# Patient Record
Sex: MALE | Race: WHITE | NOT HISPANIC OR LATINO | URBAN - METROPOLITAN AREA
[De-identification: names, ages, dates, MRNs, and addresses within clinical notes are randomized per-mention and may not be internally consistent; named-entity substitution may affect disease eponyms.]

---

## 2017-07-05 ENCOUNTER — OUTPATIENT (OUTPATIENT)
Dept: OUTPATIENT SERVICES | Facility: HOSPITAL | Age: 77
LOS: 1 days | Discharge: HOME | End: 2017-07-05

## 2017-07-05 DIAGNOSIS — S89.80XA OTHER SPECIFIED INJURIES OF UNSPECIFIED LOWER LEG, INITIAL ENCOUNTER: ICD-10-CM

## 2018-04-18 ENCOUNTER — OUTPATIENT (OUTPATIENT)
Dept: OUTPATIENT SERVICES | Facility: HOSPITAL | Age: 78
LOS: 1 days | Discharge: HOME | End: 2018-04-18

## 2018-04-18 DIAGNOSIS — N40.0 BENIGN PROSTATIC HYPERPLASIA WITHOUT LOWER URINARY TRACT SYMPTOMS: ICD-10-CM

## 2018-04-27 ENCOUNTER — OUTPATIENT (OUTPATIENT)
Dept: OUTPATIENT SERVICES | Facility: HOSPITAL | Age: 78
LOS: 1 days | Discharge: HOME | End: 2018-04-27

## 2018-04-27 DIAGNOSIS — R16.0 HEPATOMEGALY, NOT ELSEWHERE CLASSIFIED: ICD-10-CM

## 2018-11-19 ENCOUNTER — OUTPATIENT (OUTPATIENT)
Dept: OUTPATIENT SERVICES | Facility: HOSPITAL | Age: 78
LOS: 1 days | Discharge: HOME | End: 2018-11-19

## 2018-11-19 DIAGNOSIS — R60.9 EDEMA, UNSPECIFIED: ICD-10-CM

## 2019-03-26 ENCOUNTER — OUTPATIENT (OUTPATIENT)
Dept: OUTPATIENT SERVICES | Facility: HOSPITAL | Age: 79
LOS: 1 days | Discharge: HOME | End: 2019-03-26

## 2019-03-26 DIAGNOSIS — R52 PAIN, UNSPECIFIED: ICD-10-CM

## 2021-01-26 PROBLEM — Z00.00 ENCOUNTER FOR PREVENTIVE HEALTH EXAMINATION: Status: ACTIVE | Noted: 2021-01-26

## 2021-02-04 ENCOUNTER — APPOINTMENT (OUTPATIENT)
Dept: VASCULAR SURGERY | Facility: CLINIC | Age: 81
End: 2021-02-04
Payer: MEDICARE

## 2021-02-04 VITALS
WEIGHT: 285 LBS | HEIGHT: 70 IN | DIASTOLIC BLOOD PRESSURE: 75 MMHG | BODY MASS INDEX: 40.8 KG/M2 | TEMPERATURE: 97.2 F | SYSTOLIC BLOOD PRESSURE: 139 MMHG | HEART RATE: 71 BPM

## 2021-02-04 DIAGNOSIS — M19.90 UNSPECIFIED OSTEOARTHRITIS, UNSPECIFIED SITE: ICD-10-CM

## 2021-02-04 DIAGNOSIS — I10 ESSENTIAL (PRIMARY) HYPERTENSION: ICD-10-CM

## 2021-02-04 DIAGNOSIS — Z87.891 PERSONAL HISTORY OF NICOTINE DEPENDENCE: ICD-10-CM

## 2021-02-04 DIAGNOSIS — I48.0 PAROXYSMAL ATRIAL FIBRILLATION: ICD-10-CM

## 2021-02-04 DIAGNOSIS — E78.00 PURE HYPERCHOLESTEROLEMIA, UNSPECIFIED: ICD-10-CM

## 2021-02-04 DIAGNOSIS — I25.10 ATHEROSCLEROTIC HEART DISEASE OF NATIVE CORONARY ARTERY W/OUT ANGINA PECTORIS: ICD-10-CM

## 2021-02-04 DIAGNOSIS — I70.212 ATHEROSCLEROSIS OF NATIVE ARTERIES OF EXTREMITIES WITH INTERMITTENT CLAUDICATION, LEFT LEG: ICD-10-CM

## 2021-02-04 PROCEDURE — 99203 OFFICE O/P NEW LOW 30 MIN: CPT

## 2021-02-04 PROCEDURE — 93926 LOWER EXTREMITY STUDY: CPT

## 2021-02-04 RX ORDER — TRAMADOL HYDROCHLORIDE 50 MG/1
50 TABLET, COATED ORAL
Refills: 0 | Status: ACTIVE | COMMUNITY

## 2021-02-04 RX ORDER — EZETIMIBE 10 MG/1
10 TABLET ORAL
Refills: 0 | Status: ACTIVE | COMMUNITY

## 2021-02-04 RX ORDER — FUROSEMIDE 40 MG/1
40 TABLET ORAL
Refills: 0 | Status: ACTIVE | COMMUNITY

## 2021-02-04 RX ORDER — APIXABAN 5 MG/1
5 TABLET, FILM COATED ORAL
Refills: 0 | Status: ACTIVE | COMMUNITY

## 2021-02-04 RX ORDER — UBIDECARENONE 200 MG
CAPSULE ORAL
Refills: 0 | Status: ACTIVE | COMMUNITY

## 2021-02-04 RX ORDER — METOPROLOL SUCCINATE 25 MG/1
25 TABLET, EXTENDED RELEASE ORAL
Refills: 0 | Status: ACTIVE | COMMUNITY

## 2021-02-04 RX ORDER — TAMSULOSIN HCL 0.4 MG
0.4 CAPSULE ORAL
Refills: 0 | Status: ACTIVE | COMMUNITY

## 2021-02-04 RX ORDER — ATORVASTATIN CALCIUM 40 MG/1
40 TABLET, FILM COATED ORAL
Refills: 0 | Status: ACTIVE | COMMUNITY

## 2021-02-04 NOTE — HISTORY OF PRESENT ILLNESS
[FreeTextEntry1] : 81 y/o gentleman with recent episode of left leg pain, treated with Keflex for left leg cellulitis, leg pain has resolved, sent in by PMD for evaluation of PVD. He is on Eliquis for h/o atrial fibrillation.

## 2021-02-04 NOTE — DATA REVIEWED
[FreeTextEntry1] : I performed an arterial duplex which was medically necessary to evaluate for arterial insufficiency. It showed patent left femoral, popliteal arteries.\par

## 2021-02-04 NOTE — ASSESSMENT
[FreeTextEntry1] : 81 y/o gentleman with venous insufficiency, recent bout of left leg cellulitis, improving with Keflex. \par \par He has palpable lower extremity pulses on exam and arterial duplex today showed patent left femoral and popliteal arteries.\par \par No vascular surgical intervention is needed and he can follow up as needed.

## 2021-05-08 ENCOUNTER — OUTPATIENT (OUTPATIENT)
Dept: OUTPATIENT SERVICES | Facility: HOSPITAL | Age: 81
LOS: 1 days | Discharge: HOME | End: 2021-05-08

## 2021-05-08 ENCOUNTER — LABORATORY RESULT (OUTPATIENT)
Age: 81
End: 2021-05-08

## 2021-05-08 DIAGNOSIS — Z11.59 ENCOUNTER FOR SCREENING FOR OTHER VIRAL DISEASES: ICD-10-CM

## 2021-05-11 ENCOUNTER — APPOINTMENT (OUTPATIENT)
Dept: PULMONOLOGY | Facility: CLINIC | Age: 81
End: 2021-05-11
Payer: MEDICARE

## 2021-05-11 ENCOUNTER — APPOINTMENT (OUTPATIENT)
Age: 81
End: 2021-05-11
Payer: MEDICARE

## 2021-05-11 VITALS
BODY MASS INDEX: 41.66 KG/M2 | HEIGHT: 70 IN | OXYGEN SATURATION: 95 % | DIASTOLIC BLOOD PRESSURE: 80 MMHG | RESPIRATION RATE: 14 BRPM | WEIGHT: 291 LBS | SYSTOLIC BLOOD PRESSURE: 128 MMHG | HEART RATE: 82 BPM

## 2021-05-11 PROCEDURE — ZZZZZ: CPT

## 2021-05-11 PROCEDURE — 94729 DIFFUSING CAPACITY: CPT

## 2021-05-11 PROCEDURE — 99213 OFFICE O/P EST LOW 20 MIN: CPT | Mod: 25

## 2021-05-11 PROCEDURE — 94010 BREATHING CAPACITY TEST: CPT

## 2021-05-11 PROCEDURE — 94727 GAS DIL/WSHOT DETER LNG VOL: CPT

## 2021-05-11 NOTE — HISTORY OF PRESENT ILLNESS
[Excessive Sleepiness-Day] : no excessive daytime sleepiness [Witnessed Apnea] : no witnessed apnea during sleep [Witnessed Gasping] : no witnessed gasping during sleep [Snoring] : no snoring [Unrefreshing Sleep] : no unrefreshing sleep [Sleepy When Sedentary] : no sleepy when sedentary [Impaired Concentration] : no impaired concentration [CPAP] : CPAP [Good Compliance] : good compliance with treatment [Good Tolerance] : good tolerance of treatment [Good Symptom Control] : good symptom control

## 2021-07-15 ENCOUNTER — TRANSCRIPTION ENCOUNTER (OUTPATIENT)
Age: 81
End: 2021-07-15

## 2021-09-04 ENCOUNTER — TRANSCRIPTION ENCOUNTER (OUTPATIENT)
Age: 81
End: 2021-09-04

## 2021-11-23 ENCOUNTER — APPOINTMENT (OUTPATIENT)
Age: 81
End: 2021-11-23

## 2021-12-28 ENCOUNTER — APPOINTMENT (OUTPATIENT)
Age: 81
End: 2021-12-28
Payer: MEDICARE

## 2021-12-28 PROCEDURE — 99441: CPT | Mod: 95

## 2021-12-28 NOTE — HISTORY OF PRESENT ILLNESS
[Home] : at home, [unfilled] , at the time of the visit. [Medical Office: ([facility])___] : at the medical office located in

## 2022-12-13 ENCOUNTER — APPOINTMENT (OUTPATIENT)
Dept: PULMONOLOGY | Facility: CLINIC | Age: 82
End: 2022-12-13

## 2022-12-13 VITALS
SYSTOLIC BLOOD PRESSURE: 110 MMHG | HEART RATE: 62 BPM | OXYGEN SATURATION: 98 % | DIASTOLIC BLOOD PRESSURE: 70 MMHG | WEIGHT: 281 LBS | HEIGHT: 70 IN | BODY MASS INDEX: 40.23 KG/M2

## 2022-12-13 PROCEDURE — 99214 OFFICE O/P EST MOD 30 MIN: CPT

## 2023-05-16 ENCOUNTER — APPOINTMENT (OUTPATIENT)
Dept: ORTHOPEDIC SURGERY | Facility: CLINIC | Age: 83
End: 2023-05-16
Payer: MEDICARE

## 2023-05-16 VITALS — BODY MASS INDEX: 40.23 KG/M2 | WEIGHT: 281 LBS | HEIGHT: 70 IN

## 2023-05-16 DIAGNOSIS — M18.12 UNILATERAL PRIMARY OSTEOARTHRITIS OF FIRST CARPOMETACARPAL JOINT, LEFT HAND: ICD-10-CM

## 2023-05-16 DIAGNOSIS — M18.11 UNILATERAL PRIMARY OSTEOARTHRITIS OF FIRST CARPOMETACARPAL JOINT, RIGHT HAND: ICD-10-CM

## 2023-05-16 PROCEDURE — 99202 OFFICE O/P NEW SF 15 MIN: CPT

## 2023-05-16 NOTE — HISTORY OF PRESENT ILLNESS
[de-identified] : 82-year-old male comes the office for evaluation was having pain discomfort in both hands he was actually placed on prednisone for inflammation that was on a blood test and he currently is still on the prednisone and feeling much better in the hands.  When he was having the pain and discomfort he had no numbness and tingling he was having difficult time opening jars and things like

## 2023-05-16 NOTE — PHYSICAL EXAM
[de-identified] : Patient has negative Tinel's medial compression test bilaterally.  Good range of motion of the fingers.  He has no swelling erythema ecchymosis or abrasions he does have axial grind to both thumbs.

## 2023-05-16 NOTE — ASSESSMENT
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  He feels better now because he was on prednisone.  He may get worse when the prednisone stops.  He was advised possibly for anti-inflammatories.  The natural history of the condition was discussed.  He will see us back on an as needed basis.

## 2023-06-20 ENCOUNTER — APPOINTMENT (OUTPATIENT)
Dept: PULMONOLOGY | Facility: CLINIC | Age: 83
End: 2023-06-20

## 2023-07-20 ENCOUNTER — APPOINTMENT (OUTPATIENT)
Dept: PULMONOLOGY | Facility: CLINIC | Age: 83
End: 2023-07-20
Payer: MEDICARE

## 2023-07-20 VITALS
OXYGEN SATURATION: 96 % | HEIGHT: 70 IN | BODY MASS INDEX: 40.23 KG/M2 | WEIGHT: 281 LBS | RESPIRATION RATE: 14 BRPM | HEART RATE: 58 BPM | SYSTOLIC BLOOD PRESSURE: 154 MMHG | DIASTOLIC BLOOD PRESSURE: 78 MMHG

## 2023-07-20 PROCEDURE — 94729 DIFFUSING CAPACITY: CPT

## 2023-07-20 PROCEDURE — 94010 BREATHING CAPACITY TEST: CPT

## 2023-07-20 PROCEDURE — 94727 GAS DIL/WSHOT DETER LNG VOL: CPT

## 2023-07-20 PROCEDURE — 99213 OFFICE O/P EST LOW 20 MIN: CPT | Mod: 25

## 2023-07-20 NOTE — HISTORY OF PRESENT ILLNESS
[TextBox_4] : Overall he is doing good he has a new CPAP machine been using it every night getting benefit denies any cough wheezing shortness of breath even on exertion has been trying to lose weight not succeeding\par CT scan from December 2022 reviewed stable nodule from 2021 4 mm\par PFT today reviewed overall stable

## 2023-12-08 ENCOUNTER — EMERGENCY (EMERGENCY)
Facility: HOSPITAL | Age: 83
LOS: 0 days | Discharge: HOME | End: 2023-12-08
Attending: EMERGENCY MEDICINE
Payer: MEDICARE

## 2023-12-08 VITALS
WEIGHT: 283.07 LBS | DIASTOLIC BLOOD PRESSURE: 77 MMHG | RESPIRATION RATE: 18 BRPM | SYSTOLIC BLOOD PRESSURE: 162 MMHG | OXYGEN SATURATION: 95 % | HEIGHT: 70 IN | TEMPERATURE: 98 F | HEART RATE: 85 BPM

## 2023-12-08 DIAGNOSIS — S81.802A UNSPECIFIED OPEN WOUND, LEFT LOWER LEG, INITIAL ENCOUNTER: ICD-10-CM

## 2023-12-08 DIAGNOSIS — L03.116 CELLULITIS OF LEFT LOWER LIMB: ICD-10-CM

## 2023-12-08 DIAGNOSIS — M79.605 PAIN IN LEFT LEG: ICD-10-CM
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
## 2023-12-08 DIAGNOSIS — I10 ESSENTIAL (PRIMARY) HYPERTENSION: ICD-10-CM
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
## 2023-12-08 DIAGNOSIS — S80.12XA CONTUSION OF LEFT LOWER LEG, INITIAL ENCOUNTER: ICD-10-CM

## 2023-12-08 DIAGNOSIS — Z79.01 LONG TERM (CURRENT) USE OF ANTICOAGULANTS: ICD-10-CM

## 2023-12-08 DIAGNOSIS — I48.91 UNSPECIFIED ATRIAL FIBRILLATION: ICD-10-CM

## 2023-12-08 LAB
ALBUMIN SERPL ELPH-MCNC: 4.8 G/DL — SIGNIFICANT CHANGE UP (ref 3.5–5.2)
ALBUMIN SERPL ELPH-MCNC: 4.8 G/DL — SIGNIFICANT CHANGE UP (ref 3.5–5.2)
ALP SERPL-CCNC: 79 U/L — SIGNIFICANT CHANGE UP (ref 30–115)
ALP SERPL-CCNC: 79 U/L — SIGNIFICANT CHANGE UP (ref 30–115)
ALT FLD-CCNC: 14 U/L — SIGNIFICANT CHANGE UP (ref 0–41)
ALT FLD-CCNC: 14 U/L — SIGNIFICANT CHANGE UP (ref 0–41)
ANION GAP SERPL CALC-SCNC: 10 MMOL/L — SIGNIFICANT CHANGE UP (ref 7–14)
ANION GAP SERPL CALC-SCNC: 10 MMOL/L — SIGNIFICANT CHANGE UP (ref 7–14)
AST SERPL-CCNC: 15 U/L — SIGNIFICANT CHANGE UP (ref 0–41)
AST SERPL-CCNC: 15 U/L — SIGNIFICANT CHANGE UP (ref 0–41)
BASOPHILS # BLD AUTO: 0.02 K/UL — SIGNIFICANT CHANGE UP (ref 0–0.2)
BASOPHILS # BLD AUTO: 0.02 K/UL — SIGNIFICANT CHANGE UP (ref 0–0.2)
BASOPHILS NFR BLD AUTO: 0.2 % — SIGNIFICANT CHANGE UP (ref 0–1)
BASOPHILS NFR BLD AUTO: 0.2 % — SIGNIFICANT CHANGE UP (ref 0–1)
BILIRUB SERPL-MCNC: 0.6 MG/DL — SIGNIFICANT CHANGE UP (ref 0.2–1.2)
BILIRUB SERPL-MCNC: 0.6 MG/DL — SIGNIFICANT CHANGE UP (ref 0.2–1.2)
BUN SERPL-MCNC: 16 MG/DL — SIGNIFICANT CHANGE UP (ref 10–20)
BUN SERPL-MCNC: 16 MG/DL — SIGNIFICANT CHANGE UP (ref 10–20)
CALCIUM SERPL-MCNC: 9.1 MG/DL — SIGNIFICANT CHANGE UP (ref 8.4–10.4)
CALCIUM SERPL-MCNC: 9.1 MG/DL — SIGNIFICANT CHANGE UP (ref 8.4–10.4)
CHLORIDE SERPL-SCNC: 100 MMOL/L — SIGNIFICANT CHANGE UP (ref 98–110)
CHLORIDE SERPL-SCNC: 100 MMOL/L — SIGNIFICANT CHANGE UP (ref 98–110)
CO2 SERPL-SCNC: 32 MMOL/L — SIGNIFICANT CHANGE UP (ref 17–32)
CO2 SERPL-SCNC: 32 MMOL/L — SIGNIFICANT CHANGE UP (ref 17–32)
CREAT SERPL-MCNC: 1 MG/DL — SIGNIFICANT CHANGE UP (ref 0.7–1.5)
CREAT SERPL-MCNC: 1 MG/DL — SIGNIFICANT CHANGE UP (ref 0.7–1.5)
EGFR: 76 ML/MIN/1.73M2 — SIGNIFICANT CHANGE UP
EGFR: 76 ML/MIN/1.73M2 — SIGNIFICANT CHANGE UP
EOSINOPHIL # BLD AUTO: 0.09 K/UL — SIGNIFICANT CHANGE UP (ref 0–0.7)
EOSINOPHIL # BLD AUTO: 0.09 K/UL — SIGNIFICANT CHANGE UP (ref 0–0.7)
EOSINOPHIL NFR BLD AUTO: 0.8 % — SIGNIFICANT CHANGE UP (ref 0–8)
EOSINOPHIL NFR BLD AUTO: 0.8 % — SIGNIFICANT CHANGE UP (ref 0–8)
GLUCOSE SERPL-MCNC: 109 MG/DL — HIGH (ref 70–99)
GLUCOSE SERPL-MCNC: 109 MG/DL — HIGH (ref 70–99)
HCT VFR BLD CALC: 41.3 % — LOW (ref 42–52)
HCT VFR BLD CALC: 41.3 % — LOW (ref 42–52)
HGB BLD-MCNC: 13.3 G/DL — LOW (ref 14–18)
HGB BLD-MCNC: 13.3 G/DL — LOW (ref 14–18)
IMM GRANULOCYTES NFR BLD AUTO: 0.4 % — HIGH (ref 0.1–0.3)
IMM GRANULOCYTES NFR BLD AUTO: 0.4 % — HIGH (ref 0.1–0.3)
LYMPHOCYTES # BLD AUTO: 1.33 K/UL — SIGNIFICANT CHANGE UP (ref 1.2–3.4)
LYMPHOCYTES # BLD AUTO: 1.33 K/UL — SIGNIFICANT CHANGE UP (ref 1.2–3.4)
LYMPHOCYTES # BLD AUTO: 12.5 % — LOW (ref 20.5–51.1)
LYMPHOCYTES # BLD AUTO: 12.5 % — LOW (ref 20.5–51.1)
MCHC RBC-ENTMCNC: 32.2 G/DL — SIGNIFICANT CHANGE UP (ref 32–37)
MCHC RBC-ENTMCNC: 32.2 G/DL — SIGNIFICANT CHANGE UP (ref 32–37)
MCHC RBC-ENTMCNC: 32.9 PG — HIGH (ref 27–31)
MCHC RBC-ENTMCNC: 32.9 PG — HIGH (ref 27–31)
MCV RBC AUTO: 102.2 FL — HIGH (ref 80–94)
MCV RBC AUTO: 102.2 FL — HIGH (ref 80–94)
MONOCYTES # BLD AUTO: 1.15 K/UL — HIGH (ref 0.1–0.6)
MONOCYTES # BLD AUTO: 1.15 K/UL — HIGH (ref 0.1–0.6)
MONOCYTES NFR BLD AUTO: 10.8 % — HIGH (ref 1.7–9.3)
MONOCYTES NFR BLD AUTO: 10.8 % — HIGH (ref 1.7–9.3)
NEUTROPHILS # BLD AUTO: 8 K/UL — HIGH (ref 1.4–6.5)
NEUTROPHILS # BLD AUTO: 8 K/UL — HIGH (ref 1.4–6.5)
NEUTROPHILS NFR BLD AUTO: 75.3 % — HIGH (ref 42.2–75.2)
NEUTROPHILS NFR BLD AUTO: 75.3 % — HIGH (ref 42.2–75.2)
NRBC # BLD: 0 /100 WBCS — SIGNIFICANT CHANGE UP (ref 0–0)
NRBC # BLD: 0 /100 WBCS — SIGNIFICANT CHANGE UP (ref 0–0)
PLATELET # BLD AUTO: 196 K/UL — SIGNIFICANT CHANGE UP (ref 130–400)
PLATELET # BLD AUTO: 196 K/UL — SIGNIFICANT CHANGE UP (ref 130–400)
PMV BLD: 10.9 FL — HIGH (ref 7.4–10.4)
PMV BLD: 10.9 FL — HIGH (ref 7.4–10.4)
POTASSIUM SERPL-MCNC: 4.2 MMOL/L — SIGNIFICANT CHANGE UP (ref 3.5–5)
POTASSIUM SERPL-MCNC: 4.2 MMOL/L — SIGNIFICANT CHANGE UP (ref 3.5–5)
POTASSIUM SERPL-SCNC: 4.2 MMOL/L — SIGNIFICANT CHANGE UP (ref 3.5–5)
POTASSIUM SERPL-SCNC: 4.2 MMOL/L — SIGNIFICANT CHANGE UP (ref 3.5–5)
PROT SERPL-MCNC: 7.2 G/DL — SIGNIFICANT CHANGE UP (ref 6–8)
PROT SERPL-MCNC: 7.2 G/DL — SIGNIFICANT CHANGE UP (ref 6–8)
RBC # BLD: 4.04 M/UL — LOW (ref 4.7–6.1)
RBC # BLD: 4.04 M/UL — LOW (ref 4.7–6.1)
RBC # FLD: 12.9 % — SIGNIFICANT CHANGE UP (ref 11.5–14.5)
RBC # FLD: 12.9 % — SIGNIFICANT CHANGE UP (ref 11.5–14.5)
SODIUM SERPL-SCNC: 142 MMOL/L — SIGNIFICANT CHANGE UP (ref 135–146)
SODIUM SERPL-SCNC: 142 MMOL/L — SIGNIFICANT CHANGE UP (ref 135–146)
WBC # BLD: 10.63 K/UL — SIGNIFICANT CHANGE UP (ref 4.8–10.8)
WBC # BLD: 10.63 K/UL — SIGNIFICANT CHANGE UP (ref 4.8–10.8)
WBC # FLD AUTO: 10.63 K/UL — SIGNIFICANT CHANGE UP (ref 4.8–10.8)
WBC # FLD AUTO: 10.63 K/UL — SIGNIFICANT CHANGE UP (ref 4.8–10.8)

## 2023-12-08 PROCEDURE — 96374 THER/PROPH/DIAG INJ IV PUSH: CPT | Mod: XU

## 2023-12-08 PROCEDURE — 85025 COMPLETE CBC W/AUTO DIFF WBC: CPT

## 2023-12-08 PROCEDURE — 36415 COLL VENOUS BLD VENIPUNCTURE: CPT

## 2023-12-08 PROCEDURE — 99285 EMERGENCY DEPT VISIT HI MDM: CPT | Mod: GC

## 2023-12-08 PROCEDURE — 80053 COMPREHEN METABOLIC PANEL: CPT

## 2023-12-08 PROCEDURE — 99284 EMERGENCY DEPT VISIT MOD MDM: CPT | Mod: 25

## 2023-12-08 PROCEDURE — 73701 CT LOWER EXTREMITY W/DYE: CPT | Mod: MA,LT

## 2023-12-08 PROCEDURE — 73701 CT LOWER EXTREMITY W/DYE: CPT | Mod: 26,LT,MA

## 2023-12-08 RX ORDER — AMPICILLIN SODIUM AND SULBACTAM SODIUM 250; 125 MG/ML; MG/ML
1.5 INJECTION, POWDER, FOR SUSPENSION INTRAMUSCULAR; INTRAVENOUS ONCE
Refills: 0 | Status: COMPLETED | OUTPATIENT
Start: 2023-12-08 | End: 2023-12-08

## 2023-12-08 RX ADMIN — AMPICILLIN SODIUM AND SULBACTAM SODIUM 200 GRAM(S): 250; 125 INJECTION, POWDER, FOR SUSPENSION INTRAMUSCULAR; INTRAVENOUS at 21:18

## 2023-12-08 NOTE — ED PROVIDER NOTE - NSFOLLOWUPINSTRUCTIONS_ED_ALL_ED_FT
Our Emergency Department Referral Coordinators will be reaching out to you in the next 24-48 hours from 9:00am to 5:00pm with a follow up appointment. Please expect a phone call from the hospital in that time frame. If you do not receive a call or if you have any questions or concerns, you can reach them at   [phone]     HEMATOMA  A hematoma is a collection of blood under the skin, in an organ, in a body space, in a joint space, or in other tissue. The blood can thicken (clot) to form a lump that you can see and feel. The lump is often firm and may become sore and tender. Most hematomas get better in a few days to weeks. However, some hematomas may be serious and require medical care. Hematomas can range from very small to very large.    What are the causes?  This condition is caused by:  A blunt or penetrating injury.  Leakage from a blood vessel under the skin.  Some medical procedures, including surgeries, such as oral surgery, face lifts, and surgeries on the joints.  Some medical conditions that cause bleeding or bruising. There may be multiple hematomas that appear in different areas of the body.  What increases the risk?  You are more likely to develop this condition if:  You are an older adult.  You use blood thinners.  You regularly use NSAIDs, such as ibuprofen, for pain.  You play contact sports.  What are the signs or symptoms?  Comparison of a normal ankle and an ankle that is swollen and bruised.  Symptoms of this condition depend on where the hematoma is located.    Common symptoms of a hematoma that is under the skin include:  A firm lump on the body.  Pain and tenderness in the area.  Bruising. Blue, dark blue, purple-red, or yellowish skin (discoloration) may appear at the site of the hematoma if the hematoma is close to the surface of the skin.  Common symptoms of a hematoma that is deep in the tissues or body spaces may be less obvious. They include:  A collection of blood in the stomach (intra-abdominal hematoma). This may cause pain in the abdomen, weakness, fainting, and shortness of breath.  A collection of blood in the head (intracranial hematoma). This may cause a headache or symptoms such as weakness, trouble speaking or understanding, or a change in consciousness.  How is this diagnosed?  This condition is diagnosed based on:  Your medical history.  A physical exam.  Imaging tests, such as an ultrasound or CT scan. These may be needed if your health care provider suspects a hematoma in deeper tissues or body spaces.  Blood tests. These may be needed if your health care provider believes that the hematoma is caused by a medical condition.  How is this treated?  Treatment for this condition depends on the cause, size, and location of the hematoma. Treatment may include:  Doing nothing. The majority of hematomas do not need treatment as many of them go away on their own.  Surgery or close monitoring. This may be needed for large hematomas or hematomas that affect vital organs.  Medicines. Medicines may be given if there is an underlying medical cause for the hematoma.  Follow these instructions at home:  Managing pain, stiffness, and swelling    Bag of ice on a towel on the skin.  If directed, put ice on the injured area. To do this:  Put ice in a plastic bag.  Place a towel between your skin and the bag.  Leave the ice on for 20 minutes, 2–3 times a day for the first couple of days.  If your skin turns bright red, remove the ice right away to prevent skin damage. The risk of skin damage is higher if you cannot feel pain, heat, or cold.  If directed, apply heat to the affected area as often as told by your health care provider. Use the heat source that your health care provider recommends, such as a moist heat pack or a heating pad.  Place a towel between your skin and the heat source.  Leave the heat on for 20–30 minutes.  If your skin turns bright red, remove the heat right away to prevent burns. The risk of burns is higher if you cannot feel pain, heat, or cold.  Raise (elevate) the injured area above the level of your heart while you are sitting or lying down.  If directed, wrap the affected area with an elastic bandage. The bandage applies pressure (compression) to the area, which may help to reduce swelling and promote healing. Do not wrap the bandage too tightly around the affected area.  If your hematoma is on a leg or foot (lower extremity) and is painful, your health care provider may recommend crutches. Use them as told by your health care provider.  General instructions    Take over-the-counter and prescription medicines only as told by your health care provider.  Rest the injured area as directed by your health care provider.  Keep all follow-up visits. Your health care provider may want to see how your hematoma is progressing with treatment.  Contact a health care provider if:  You have a fever.  The swelling or discoloration gets worse.  You develop more hematomas.  Your pain is worse or your pain is not controlled with medicine.  Your skin over the hematoma breaks or starts bleeding.  Get help right away if:  Your hematoma is in your chest or abdomen and you have weakness, shortness of breath, or a change in consciousness.  You have a hematoma on your scalp that is caused by a fall or injury, and you also have:  A headache that gets worse.  Trouble speaking or understanding speech.  Weakness.  A change in alertness or consciousness.  These symptoms may be an emergency. Get help right away. Call 911.  Do not wait to see if the symptoms will go away.  Do not drive yourself to the hospital.    CELLULITIS  A person's legs and feet. One leg is normal and the other leg is affected by cellulitis.  Cellulitis is a skin infection. The infected area is usually warm, red, swollen, and tender. It most commonly occurs on the lower body, such as the legs, feet, and toes, but this condition can occur on any part of the body. The infection can travel to the muscles, blood, and underlying tissue and become life-threatening without treatment. It is important to get medical treatment right away for this condition.    What are the causes?  Cellulitis is caused by bacteria. The bacteria enter through a break in the skin, such as a cut, burn, insect or animal bite, open sore, or crack.    What increases the risk?  This condition is more likely to occur in people who:  Have a weak body's defense system (immune system).  Are older than 60 years old.  Have diabetes.  Have a type of long-term (chronic) liver disease (cirrhosis) or kidney disease.  Are obese.  Have a skin condition such as:  An itchy rash, such as eczema or psoriasis.  A fungal rash on the feet or in skinfolds.  Blistering rashes, such as shingles or chickenpox.  Slow movement of blood in the veins (venous stasis).  Fluid buildup below the skin (edema).  Have open wounds on the skin, such as cuts, puncture wounds, burns, bites, scrapes, tattoos, piercings, or wounds from surgery.  Have had radiation therapy.  Use IV drugs.  What are the signs or symptoms?  Symptoms of this condition include:  Skin that looks red, purple, or slightly darker than your usual skin color.  Streaks or spots on the skin.  Swollen area of the skin.  Tenderness or pain when an area of the skin is touched.  Warm skin.  Fever or chills.  Blisters.  Tiredness (fatigue).  How is this diagnosed?  This condition is diagnosed based on a medical history and physical exam. You may also have tests, including:  Blood tests.  Imaging tests.  Tests on a sample of fluid taken from the wound (wound culture).  How is this treated?  Treatment for this condition may include:  Medicines. These may include antibiotics or medicines to treat allergies (antihistamines).  Rest.  Applying cold or warm wet cloths (compresses) to the skin.  If the condition is severe, you may need to stay in the hospital and get antibiotics through an IV.  The infection usually starts to get better within 1–2 days of treatment.    Follow these instructions at home:  Medicines    Take over-the-counter and prescription medicines only as told by your health care provider.  If you were prescribed antibiotics, take them as told by your provider. Do not stop using the antibiotic even if you start to feel better.  General instructions    Drink enough fluid to keep your pee (urine) pale yellow.  Do not touch or rub the infected area.  Raise (elevate) the infected area above the level of your heart while you are sitting or lying down.  Return to your normal activities as told by your provider. Ask your provider what activities are safe for you.  Apply warm or cold compresses to the affected area as told by your provider.  Keep all follow-up visits. Your provider will need to make sure that a more serious infection is not developing.  Contact a health care provider if:  You have a fever.  Your symptoms do not improve within 1–2 days of starting treatment or you develop new symptoms.  Your bone or joint underneath the infected area becomes painful after the skin has healed.  Your infection returns in the same area or another area. Signs of this may include:  You notice a swollen bump in the infected area.  Your red area gets larger, turns dark in color, or becomes more painful.  Drainage increases.  Pus or a bad smell develops in your infected area.  You have more pain.  You feel ill and have muscle aches and weakness.  You develop vomiting or diarrhea that will not go away.  Get help right away if:  You notice red streaks coming from the infected area.  You notice the skin turns purple or black and falls off.  This symptom may be an emergency. Get help right away. Call 911.  Do not wait to see if the symptom will go away.  Do not drive yourself to the hospital. Our Emergency Department Referral Coordinators will be reaching out to you in the next 24-48 hours from 9:00am to 5:00pm with a follow up appointment. Please expect a phone call from the hospital in that time frame. If you do not receive a call or if you have any questions or concerns, you can reach them at   [phone]     HEMATOMA  A hematoma is a collection of blood under the skin, in an organ, in a body space, in a joint space, or in other tissue. The blood can thicken (clot) to form a lump that you can see and feel. The lump is often firm and may become sore and tender. Most hematomas get better in a few days to weeks. However, some hematomas may be serious and require medical care. Hematomas can range from very small to very large.    What are the causes?  This condition is caused by:  A blunt or penetrating injury.  Leakage from a blood vessel under the skin.  Some medical procedures, including surgeries, such as oral surgery, face lifts, and surgeries on the joints.  Some medical conditions that cause bleeding or bruising. There may be multiple hematomas that appear in different areas of the body.  What increases the risk?  You are more likely to develop this condition if:  You are an older adult.  You use blood thinners.  You regularly use NSAIDs, such as ibuprofen, for pain.  You play contact sports.  What are the signs or symptoms?  Comparison of a normal ankle and an ankle that is swollen and bruised.  Symptoms of this condition depend on where the hematoma is located.    Common symptoms of a hematoma that is under the skin include:  A firm lump on the body.  Pain and tenderness in the area.  Bruising. Blue, dark blue, purple-red, or yellowish skin (discoloration) may appear at the site of the hematoma if the hematoma is close to the surface of the skin.  Common symptoms of a hematoma that is deep in the tissues or body spaces may be less obvious. They include:  A collection of blood in the stomach (intra-abdominal hematoma). This may cause pain in the abdomen, weakness, fainting, and shortness of breath.  A collection of blood in the head (intracranial hematoma). This may cause a headache or symptoms such as weakness, trouble speaking or understanding, or a change in consciousness.  How is this diagnosed?  This condition is diagnosed based on:  Your medical history.  A physical exam.  Imaging tests, such as an ultrasound or CT scan. These may be needed if your health care provider suspects a hematoma in deeper tissues or body spaces.  Blood tests. These may be needed if your health care provider believes that the hematoma is caused by a medical condition.  How is this treated?  Treatment for this condition depends on the cause, size, and location of the hematoma. Treatment may include:  Doing nothing. The majority of hematomas do not need treatment as many of them go away on their own.  Surgery or close monitoring. This may be needed for large hematomas or hematomas that affect vital organs.  Medicines. Medicines may be given if there is an underlying medical cause for the hematoma.  Follow these instructions at home:  Managing pain, stiffness, and swelling    Bag of ice on a towel on the skin.  If directed, put ice on the injured area. To do this:  Put ice in a plastic bag.  Place a towel between your skin and the bag.  Leave the ice on for 20 minutes, 2–3 times a day for the first couple of days.  If your skin turns bright red, remove the ice right away to prevent skin damage. The risk of skin damage is higher if you cannot feel pain, heat, or cold.  If directed, apply heat to the affected area as often as told by your health care provider. Use the heat source that your health care provider recommends, such as a moist heat pack or a heating pad.  Place a towel between your skin and the heat source.  Leave the heat on for 20–30 minutes.  If your skin turns bright red, remove the heat right away to prevent burns. The risk of burns is higher if you cannot feel pain, heat, or cold.  Raise (elevate) the injured area above the level of your heart while you are sitting or lying down.  If directed, wrap the affected area with an elastic bandage. The bandage applies pressure (compression) to the area, which may help to reduce swelling and promote healing. Do not wrap the bandage too tightly around the affected area.  If your hematoma is on a leg or foot (lower extremity) and is painful, your health care provider may recommend crutches. Use them as told by your health care provider.  General instructions    Take over-the-counter and prescription medicines only as told by your health care provider.  Rest the injured area as directed by your health care provider.  Keep all follow-up visits. Your health care provider may want to see how your hematoma is progressing with treatment.  Contact a health care provider if:  You have a fever.  The swelling or discoloration gets worse.  You develop more hematomas.  Your pain is worse or your pain is not controlled with medicine.  Your skin over the hematoma breaks or starts bleeding.  Get help right away if:  Your hematoma is in your chest or abdomen and you have weakness, shortness of breath, or a change in consciousness.  You have a hematoma on your scalp that is caused by a fall or injury, and you also have:  A headache that gets worse.  Trouble speaking or understanding speech.  Weakness.  A change in alertness or consciousness.  These symptoms may be an emergency. Get help right away. Call 911.  Do not wait to see if the symptoms will go away.  Do not drive yourself to the hospital.    CELLULITIS  A person's legs and feet. One leg is normal and the other leg is affected by cellulitis.  Cellulitis is a skin infection. The infected area is usually warm, red, swollen, and tender. It most commonly occurs on the lower body, such as the legs, feet, and toes, but this condition can occur on any part of the body. The infection can travel to the muscles, blood, and underlying tissue and become life-threatening without treatment. It is important to get medical treatment right away for this condition.    What are the causes?  Cellulitis is caused by bacteria. The bacteria enter through a break in the skin, such as a cut, burn, insect or animal bite, open sore, or crack.    What increases the risk?  This condition is more likely to occur in people who:  Have a weak body's defense system (immune system).  Are older than 60 years old.  Have diabetes.  Have a type of long-term (chronic) liver disease (cirrhosis) or kidney disease.  Are obese.  Have a skin condition such as:  An itchy rash, such as eczema or psoriasis.  A fungal rash on the feet or in skinfolds.  Blistering rashes, such as shingles or chickenpox.  Slow movement of blood in the veins (venous stasis).  Fluid buildup below the skin (edema).  Have open wounds on the skin, such as cuts, puncture wounds, burns, bites, scrapes, tattoos, piercings, or wounds from surgery.  Have had radiation therapy.  Use IV drugs.  What are the signs or symptoms?  Symptoms of this condition include:  Skin that looks red, purple, or slightly darker than your usual skin color.  Streaks or spots on the skin.  Swollen area of the skin.  Tenderness or pain when an area of the skin is touched.  Warm skin.  Fever or chills.  Blisters.  Tiredness (fatigue).  How is this diagnosed?  This condition is diagnosed based on a medical history and physical exam. You may also have tests, including:  Blood tests.  Imaging tests.  Tests on a sample of fluid taken from the wound (wound culture).  How is this treated?  Treatment for this condition may include:  Medicines. These may include antibiotics or medicines to treat allergies (antihistamines).  Rest.  Applying cold or warm wet cloths (compresses) to the skin.  If the condition is severe, you may need to stay in the hospital and get antibiotics through an IV.  The infection usually starts to get better within 1–2 days of treatment.    Follow these instructions at home:  Medicines    Take over-the-counter and prescription medicines only as told by your health care provider.  If you were prescribed antibiotics, take them as told by your provider. Do not stop using the antibiotic even if you start to feel better.  General instructions    Drink enough fluid to keep your pee (urine) pale yellow.  Do not touch or rub the infected area.  Raise (elevate) the infected area above the level of your heart while you are sitting or lying down.  Return to your normal activities as told by your provider. Ask your provider what activities are safe for you.  Apply warm or cold compresses to the affected area as told by your provider.  Keep all follow-up visits. Your provider will need to make sure that a more serious infection is not developing.  Contact a health care provider if:  You have a fever.  Your symptoms do not improve within 1–2 days of starting treatment or you develop new symptoms.  Your bone or joint underneath the infected area becomes painful after the skin has healed.  Your infection returns in the same area or another area. Signs of this may include:  You notice a swollen bump in the infected area.  Your red area gets larger, turns dark in color, or becomes more painful.  Drainage increases.  Pus or a bad smell develops in your infected area.  You have more pain.  You feel ill and have muscle aches and weakness.  You develop vomiting or diarrhea that will not go away.  Get help right away if:  You notice red streaks coming from the infected area.  You notice the skin turns purple or black and falls off.  This symptom may be an emergency. Get help right away. Call 911.  Do not wait to see if the symptom will go away.  Do not drive yourself to the hospital.

## 2023-12-08 NOTE — ED PROVIDER NOTE - OBJECTIVE STATEMENT
83-year-old male with PMH A-fib on Eliquis, HTN, presenting for left leg wound X 2 days.  Patient reports he bumped his leg 2 days ago and had a skin tear which she saw his PMD for yesterday.  At that time patient had poorly controlled bleeding and was advised by his PMD to hold his Eliquis; PMD dressed wound and prescribed Augmentin.  Today patient followed up with PMD's office and PA sent him to ED for increased pain and swelling of affected area.  Last Eliquis dose yesterday morning.  Patient has gotten 2 doses of Augmentin so far.  Denies fever, calf pain or swelling, change in skin color.  Patient has chronic venous stasis and notes that the redness of his left leg has been unchanged and is worse on the right at baseline.

## 2023-12-08 NOTE — ED PROVIDER NOTE - PATIENT PORTAL LINK FT
You can access the FollowMyHealth Patient Portal offered by [facility] by registering at the following website: [email or web address]. By joining [employer]s FollowMyHealth portal, you will also be able to view your health information using other applications (apps) compatible with our system. You can access the FollowMyHealth Patient Portal offered by [facility] by registering at the following website: [email or web address]. By joining [id number]’s FollowMyHealth portal, you will also be able to view your health information using other applications (apps) compatible with our system.

## 2023-12-08 NOTE — ED PROVIDER NOTE - PHYSICAL EXAMINATION
CONSTITUTIONAL: Well-developed; well-nourished; in no acute distress.   SKIN: Warm, dry  HEAD: Normocephalic; atraumatic  EYES: EOMI, normal sclera and conjunctiva   ENT: No nasal discharge; airway clear.  CARD:  Regular rate and rhythm. Normal S1, S2  RESP: No increased WOB. CTA b/l without wheezes, crackles, rhonchi  ABD: Soft, nontender, nondistended.  EXT: Normal ROM. Bilateral lower extremity nonpittnig edema, L>R.  Distal left lower leg well-demarcated erythema.  Anterior left lower leg swelling/deformity with large skin tear, no active bleeding, tender.  No purulent discharge  NEURO: Alert, oriented, grossly unremarkable  PSYCH: Cooperative, appropriate.

## 2023-12-08 NOTE — ED ADULT TRIAGE NOTE - CHIEF COMPLAINT QUOTE
"a couple days ago I scrapped my edema leg and it was bleeding. today my PA looked at it and its still bleeding and tender. I stopped my Eliquis last night " pt on second day of Augmentin

## 2023-12-08 NOTE — ED PROVIDER NOTE - CLINICAL SUMMARY MEDICAL DECISION MAKING FREE TEXT BOX
No distress.  VSS.  CT confirms hematoma with likely underlying cellulitis, consistent with physical exam.  Has taken 2 doses of Augmentin at home.  IV Unasyn given.  DC home.  Continue Abx as prescribed.  Strict return instructions discussed.

## 2023-12-08 NOTE — ED ADULT NURSE NOTE - NSFALLUNIVINTERV_ED_ALL_ED
Bed/Stretcher in lowest position, wheels locked, appropriate side rails in place/Call bell, personal items and telephone in reach/Instruct patient to call for assistance before getting out of bed/chair/stretcher/Non-slip footwear applied when patient is off stretcher/[place] to call system/Physically safe environment - no spills, clutter or unnecessary equipment/Purposeful proactive rounding/Room/bathroom lighting operational, light cord in reach Bed/Stretcher in lowest position, wheels locked, appropriate side rails in place/Call bell, personal items and telephone in reach/Instruct patient to call for assistance before getting out of bed/chair/stretcher/Non-slip footwear applied when patient is off stretcher/[place] to call system/Physically safe environment - no spills, clutter or unnecessary equipment/Purposeful proactive rounding/Room/bathroom lighting operational, light cord in reach

## 2023-12-08 NOTE — ED PROVIDER NOTE - NSFOLLOWUPCLINICS_GEN_ALL_ED_FT
[facility]  Burn  [address], [address]  [place] [address]  Phone: [phone]  Fax:   Follow Up Time: 1-3 Days     [facility]  Burn  [address], [address]  [place] [address]  Phone: [phone]  Fax:   Follow Up Time: 1-3 Days

## 2023-12-19 ENCOUNTER — LABORATORY RESULT (OUTPATIENT)
Age: 83
End: 2023-12-19

## 2023-12-19 ENCOUNTER — APPOINTMENT (OUTPATIENT)
Dept: BURN CARE | Facility: CLINIC | Age: 83
End: 2023-12-19
Payer: MEDICARE

## 2023-12-19 ENCOUNTER — OUTPATIENT (OUTPATIENT)
Dept: OUTPATIENT SERVICES | Facility: HOSPITAL | Age: 83
LOS: 1 days | End: 2023-12-19
Payer: MEDICARE

## 2023-12-19 ENCOUNTER — INPATIENT (INPATIENT)
Facility: HOSPITAL | Age: 83
LOS: 4 days | Discharge: HOME HEALTH | DRG: 571 | End: 2023-12-24
Attending: PLASTIC SURGERY | Admitting: PLASTIC SURGERY
Payer: MEDICARE

## 2023-12-19 VITALS
SYSTOLIC BLOOD PRESSURE: 127 MMHG | OXYGEN SATURATION: 98 % | DIASTOLIC BLOOD PRESSURE: 62 MMHG | WEIGHT: 127 LBS | TEMPERATURE: 97.5 F | HEART RATE: 59 BPM | HEIGHT: 70 IN | BODY MASS INDEX: 18.18 KG/M2

## 2023-12-19 VITALS
WEIGHT: 281.97 LBS | RESPIRATION RATE: 16 BRPM | DIASTOLIC BLOOD PRESSURE: 63 MMHG | HEIGHT: 70 IN | HEART RATE: 90 BPM | SYSTOLIC BLOOD PRESSURE: 132 MMHG | TEMPERATURE: 98 F | OXYGEN SATURATION: 95 %

## 2023-12-19 DIAGNOSIS — I48.20 CHRONIC ATRIAL FIBRILLATION, UNSPECIFIED: ICD-10-CM

## 2023-12-19 DIAGNOSIS — N40.0 BENIGN PROSTATIC HYPERPLASIA WITHOUT LOWER URINARY TRACT SYMPTOMS: ICD-10-CM

## 2023-12-19 DIAGNOSIS — S81.802A UNSPECIFIED OPEN WOUND, LEFT LOWER LEG, INITIAL ENCOUNTER: ICD-10-CM

## 2023-12-19 DIAGNOSIS — Z79.02 LONG TERM (CURRENT) USE OF ANTITHROMBOTICS/ANTIPLATELETS: ICD-10-CM

## 2023-12-19 DIAGNOSIS — I25.10 ATHEROSCLEROTIC HEART DISEASE OF NATIVE CORONARY ARTERY WITHOUT ANGINA PECTORIS: ICD-10-CM

## 2023-12-19 DIAGNOSIS — T14.8XXA OTHER INJURY OF UNSPECIFIED BODY REGION, INITIAL ENCOUNTER: ICD-10-CM

## 2023-12-19 DIAGNOSIS — Z95.5 PRESENCE OF CORONARY ANGIOPLASTY IMPLANT AND GRAFT: ICD-10-CM

## 2023-12-19 DIAGNOSIS — E66.9 OBESITY, UNSPECIFIED: ICD-10-CM
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
## 2023-12-19 DIAGNOSIS — Z00.8 ENCOUNTER FOR OTHER GENERAL EXAMINATION: ICD-10-CM

## 2023-12-19 DIAGNOSIS — Z79.01 LONG TERM (CURRENT) USE OF ANTICOAGULANTS: ICD-10-CM

## 2023-12-19 DIAGNOSIS — R73.03 PREDIABETES: ICD-10-CM

## 2023-12-19 DIAGNOSIS — M19.90 UNSPECIFIED OSTEOARTHRITIS, UNSPECIFIED SITE: ICD-10-CM
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
## 2023-12-19 DIAGNOSIS — L03.116 CELLULITIS OF LEFT LOWER LIMB: ICD-10-CM

## 2023-12-19 DIAGNOSIS — L97.922 NON-PRESSURE CHRONIC ULCER OF UNSPECIFIED PART OF LEFT LOWER LEG WITH FAT LAYER EXPOSED: ICD-10-CM

## 2023-12-19 LAB
ALBUMIN SERPL ELPH-MCNC: 4.1 G/DL — SIGNIFICANT CHANGE UP (ref 3.5–5.2)
ALBUMIN SERPL ELPH-MCNC: 4.1 G/DL — SIGNIFICANT CHANGE UP (ref 3.5–5.2)
ALP SERPL-CCNC: 85 U/L — SIGNIFICANT CHANGE UP (ref 30–115)
ALP SERPL-CCNC: 85 U/L — SIGNIFICANT CHANGE UP (ref 30–115)
ALT FLD-CCNC: 16 U/L — SIGNIFICANT CHANGE UP (ref 0–41)
ALT FLD-CCNC: 16 U/L — SIGNIFICANT CHANGE UP (ref 0–41)
ANION GAP SERPL CALC-SCNC: 14 MMOL/L — SIGNIFICANT CHANGE UP (ref 7–14)
ANION GAP SERPL CALC-SCNC: 14 MMOL/L — SIGNIFICANT CHANGE UP (ref 7–14)
APTT BLD: 50.5 SEC — HIGH (ref 27–39.2)
APTT BLD: 50.5 SEC — HIGH (ref 27–39.2)
AST SERPL-CCNC: 17 U/L — SIGNIFICANT CHANGE UP (ref 0–41)
AST SERPL-CCNC: 17 U/L — SIGNIFICANT CHANGE UP (ref 0–41)
BASOPHILS # BLD AUTO: 0.01 K/UL — SIGNIFICANT CHANGE UP (ref 0–0.2)
BASOPHILS # BLD AUTO: 0.01 K/UL — SIGNIFICANT CHANGE UP (ref 0–0.2)
BASOPHILS NFR BLD AUTO: 0.1 % — SIGNIFICANT CHANGE UP (ref 0–1)
BASOPHILS NFR BLD AUTO: 0.1 % — SIGNIFICANT CHANGE UP (ref 0–1)
BILIRUB SERPL-MCNC: 0.6 MG/DL — SIGNIFICANT CHANGE UP (ref 0.2–1.2)
BILIRUB SERPL-MCNC: 0.6 MG/DL — SIGNIFICANT CHANGE UP (ref 0.2–1.2)
BUN SERPL-MCNC: 12 MG/DL — SIGNIFICANT CHANGE UP (ref 10–20)
BUN SERPL-MCNC: 12 MG/DL — SIGNIFICANT CHANGE UP (ref 10–20)
CALCIUM SERPL-MCNC: 9 MG/DL — SIGNIFICANT CHANGE UP (ref 8.4–10.5)
CALCIUM SERPL-MCNC: 9 MG/DL — SIGNIFICANT CHANGE UP (ref 8.4–10.5)
CHLORIDE SERPL-SCNC: 99 MMOL/L — SIGNIFICANT CHANGE UP (ref 98–110)
CHLORIDE SERPL-SCNC: 99 MMOL/L — SIGNIFICANT CHANGE UP (ref 98–110)
CO2 SERPL-SCNC: 28 MMOL/L — SIGNIFICANT CHANGE UP (ref 17–32)
CO2 SERPL-SCNC: 28 MMOL/L — SIGNIFICANT CHANGE UP (ref 17–32)
CREAT SERPL-MCNC: 1 MG/DL — SIGNIFICANT CHANGE UP (ref 0.7–1.5)
CREAT SERPL-MCNC: 1 MG/DL — SIGNIFICANT CHANGE UP (ref 0.7–1.5)
EGFR: 76 ML/MIN/1.73M2 — SIGNIFICANT CHANGE UP
EGFR: 76 ML/MIN/1.73M2 — SIGNIFICANT CHANGE UP
EOSINOPHIL # BLD AUTO: 0.13 K/UL — SIGNIFICANT CHANGE UP (ref 0–0.7)
EOSINOPHIL # BLD AUTO: 0.13 K/UL — SIGNIFICANT CHANGE UP (ref 0–0.7)
EOSINOPHIL NFR BLD AUTO: 1.3 % — SIGNIFICANT CHANGE UP (ref 0–8)
EOSINOPHIL NFR BLD AUTO: 1.3 % — SIGNIFICANT CHANGE UP (ref 0–8)
GLUCOSE SERPL-MCNC: 92 MG/DL — SIGNIFICANT CHANGE UP (ref 70–99)
GLUCOSE SERPL-MCNC: 92 MG/DL — SIGNIFICANT CHANGE UP (ref 70–99)
HCT VFR BLD CALC: 36 % — LOW (ref 42–52)
HCT VFR BLD CALC: 36 % — LOW (ref 42–52)
HGB BLD-MCNC: 11.9 G/DL — LOW (ref 14–18)
HGB BLD-MCNC: 11.9 G/DL — LOW (ref 14–18)
IMM GRANULOCYTES NFR BLD AUTO: 0.4 % — HIGH (ref 0.1–0.3)
IMM GRANULOCYTES NFR BLD AUTO: 0.4 % — HIGH (ref 0.1–0.3)
INR BLD: 1.72 RATIO — HIGH (ref 0.65–1.3)
INR BLD: 1.72 RATIO — HIGH (ref 0.65–1.3)
LACTATE SERPL-SCNC: 1.2 MMOL/L — SIGNIFICANT CHANGE UP (ref 0.7–2)
LACTATE SERPL-SCNC: 1.2 MMOL/L — SIGNIFICANT CHANGE UP (ref 0.7–2)
LYMPHOCYTES # BLD AUTO: 1.01 K/UL — LOW (ref 1.2–3.4)
LYMPHOCYTES # BLD AUTO: 1.01 K/UL — LOW (ref 1.2–3.4)
LYMPHOCYTES # BLD AUTO: 10.2 % — LOW (ref 20.5–51.1)
LYMPHOCYTES # BLD AUTO: 10.2 % — LOW (ref 20.5–51.1)
MAGNESIUM SERPL-MCNC: 2 MG/DL — SIGNIFICANT CHANGE UP (ref 1.8–2.4)
MAGNESIUM SERPL-MCNC: 2 MG/DL — SIGNIFICANT CHANGE UP (ref 1.8–2.4)
MCHC RBC-ENTMCNC: 33.1 G/DL — SIGNIFICANT CHANGE UP (ref 32–37)
MCHC RBC-ENTMCNC: 33.1 G/DL — SIGNIFICANT CHANGE UP (ref 32–37)
MCHC RBC-ENTMCNC: 33.3 PG — HIGH (ref 27–31)
MCHC RBC-ENTMCNC: 33.3 PG — HIGH (ref 27–31)
MCV RBC AUTO: 100.8 FL — HIGH (ref 80–94)
MCV RBC AUTO: 100.8 FL — HIGH (ref 80–94)
MONOCYTES # BLD AUTO: 1.2 K/UL — HIGH (ref 0.1–0.6)
MONOCYTES # BLD AUTO: 1.2 K/UL — HIGH (ref 0.1–0.6)
MONOCYTES NFR BLD AUTO: 12.1 % — HIGH (ref 1.7–9.3)
MONOCYTES NFR BLD AUTO: 12.1 % — HIGH (ref 1.7–9.3)
NEUTROPHILS # BLD AUTO: 7.55 K/UL — HIGH (ref 1.4–6.5)
NEUTROPHILS # BLD AUTO: 7.55 K/UL — HIGH (ref 1.4–6.5)
NEUTROPHILS NFR BLD AUTO: 75.9 % — HIGH (ref 42.2–75.2)
NEUTROPHILS NFR BLD AUTO: 75.9 % — HIGH (ref 42.2–75.2)
NRBC # BLD: 0 /100 WBCS — SIGNIFICANT CHANGE UP (ref 0–0)
NRBC # BLD: 0 /100 WBCS — SIGNIFICANT CHANGE UP (ref 0–0)
PHOSPHATE SERPL-MCNC: 3.1 MG/DL — SIGNIFICANT CHANGE UP (ref 2.1–4.9)
PHOSPHATE SERPL-MCNC: 3.1 MG/DL — SIGNIFICANT CHANGE UP (ref 2.1–4.9)
PLATELET # BLD AUTO: 312 K/UL — SIGNIFICANT CHANGE UP (ref 130–400)
PLATELET # BLD AUTO: 312 K/UL — SIGNIFICANT CHANGE UP (ref 130–400)
PMV BLD: 10.6 FL — HIGH (ref 7.4–10.4)
PMV BLD: 10.6 FL — HIGH (ref 7.4–10.4)
POTASSIUM SERPL-MCNC: 3.9 MMOL/L — SIGNIFICANT CHANGE UP (ref 3.5–5)
POTASSIUM SERPL-MCNC: 3.9 MMOL/L — SIGNIFICANT CHANGE UP (ref 3.5–5)
POTASSIUM SERPL-SCNC: 3.9 MMOL/L — SIGNIFICANT CHANGE UP (ref 3.5–5)
POTASSIUM SERPL-SCNC: 3.9 MMOL/L — SIGNIFICANT CHANGE UP (ref 3.5–5)
PROT SERPL-MCNC: 6.7 G/DL — SIGNIFICANT CHANGE UP (ref 6–8)
PROT SERPL-MCNC: 6.7 G/DL — SIGNIFICANT CHANGE UP (ref 6–8)
PROTHROM AB SERPL-ACNC: 19.7 SEC — HIGH (ref 9.95–12.87)
PROTHROM AB SERPL-ACNC: 19.7 SEC — HIGH (ref 9.95–12.87)
RBC # BLD: 3.57 M/UL — LOW (ref 4.7–6.1)
RBC # BLD: 3.57 M/UL — LOW (ref 4.7–6.1)
RBC # FLD: 12.5 % — SIGNIFICANT CHANGE UP (ref 11.5–14.5)
RBC # FLD: 12.5 % — SIGNIFICANT CHANGE UP (ref 11.5–14.5)
SODIUM SERPL-SCNC: 141 MMOL/L — SIGNIFICANT CHANGE UP (ref 135–146)
SODIUM SERPL-SCNC: 141 MMOL/L — SIGNIFICANT CHANGE UP (ref 135–146)
WBC # BLD: 9.94 K/UL — SIGNIFICANT CHANGE UP (ref 4.8–10.8)
WBC # BLD: 9.94 K/UL — SIGNIFICANT CHANGE UP (ref 4.8–10.8)
WBC # FLD AUTO: 9.94 K/UL — SIGNIFICANT CHANGE UP (ref 4.8–10.8)
WBC # FLD AUTO: 9.94 K/UL — SIGNIFICANT CHANGE UP (ref 4.8–10.8)

## 2023-12-19 PROCEDURE — 99222 1ST HOSP IP/OBS MODERATE 55: CPT | Mod: FS,25

## 2023-12-19 PROCEDURE — 71045 X-RAY EXAM CHEST 1 VIEW: CPT | Mod: 26

## 2023-12-19 PROCEDURE — 11042 DBRDMT SUBQ TIS 1ST 20SQCM/<: CPT

## 2023-12-19 PROCEDURE — 82962 GLUCOSE BLOOD TEST: CPT

## 2023-12-19 PROCEDURE — 87077 CULTURE AEROBIC IDENTIFY: CPT

## 2023-12-19 PROCEDURE — 11045 DBRDMT SUBQ TISS EACH ADDL: CPT

## 2023-12-19 PROCEDURE — 80048 BASIC METABOLIC PNL TOTAL CA: CPT

## 2023-12-19 PROCEDURE — 87070 CULTURE OTHR SPECIMN AEROBIC: CPT

## 2023-12-19 PROCEDURE — 93970 EXTREMITY STUDY: CPT | Mod: 26

## 2023-12-19 PROCEDURE — 88304 TISSUE EXAM BY PATHOLOGIST: CPT

## 2023-12-19 PROCEDURE — 83735 ASSAY OF MAGNESIUM: CPT

## 2023-12-19 PROCEDURE — 84100 ASSAY OF PHOSPHORUS: CPT

## 2023-12-19 PROCEDURE — 87641 MR-STAPH DNA AMP PROBE: CPT

## 2023-12-19 PROCEDURE — 99285 EMERGENCY DEPT VISIT HI MDM: CPT

## 2023-12-19 PROCEDURE — 87640 STAPH A DNA AMP PROBE: CPT

## 2023-12-19 PROCEDURE — 87075 CULTR BACTERIA EXCEPT BLOOD: CPT

## 2023-12-19 PROCEDURE — 36415 COLL VENOUS BLD VENIPUNCTURE: CPT

## 2023-12-19 PROCEDURE — 85025 COMPLETE CBC W/AUTO DIFF WBC: CPT

## 2023-12-19 PROCEDURE — 87186 SC STD MICRODIL/AGAR DIL: CPT

## 2023-12-19 PROCEDURE — 97161 PT EVAL LOW COMPLEX 20 MIN: CPT | Mod: GP

## 2023-12-19 PROCEDURE — 71045 X-RAY EXAM CHEST 1 VIEW: CPT

## 2023-12-19 RX ORDER — MORPHINE SULFATE 50 MG/1
4 CAPSULE, EXTENDED RELEASE ORAL
Refills: 0 | Status: DISCONTINUED | OUTPATIENT
Start: 2023-12-19 | End: 2023-12-22

## 2023-12-19 RX ORDER — SACCHAROMYCES BOULARDII 250 MG
250 POWDER IN PACKET (EA) ORAL
Refills: 0 | Status: DISCONTINUED | OUTPATIENT
Start: 2023-12-19 | End: 2023-12-22

## 2023-12-19 RX ORDER — SENNA PLUS 8.6 MG/1
2 TABLET ORAL AT BEDTIME
Refills: 0 | Status: DISCONTINUED | OUTPATIENT
Start: 2023-12-19 | End: 2023-12-22

## 2023-12-19 RX ORDER — BACITRACIN ZINC 500 UNIT/G
1 OINTMENT IN PACKET (EA) TOPICAL
Refills: 0 | Status: DISCONTINUED | OUTPATIENT
Start: 2023-12-19 | End: 2023-12-22

## 2023-12-19 RX ORDER — EZETIMIBE 10 MG/1
1 TABLET ORAL
Refills: 0 | DISCHARGE

## 2023-12-19 RX ORDER — MORPHINE SULFATE 50 MG/1
2 CAPSULE, EXTENDED RELEASE ORAL EVERY 6 HOURS
Refills: 0 | Status: DISCONTINUED | OUTPATIENT
Start: 2023-12-19 | End: 2023-12-22

## 2023-12-19 RX ORDER — CHLORHEXIDINE GLUCONATE 213 G/1000ML
1 SOLUTION TOPICAL
Refills: 0 | Status: DISCONTINUED | OUTPATIENT
Start: 2023-12-19 | End: 2023-12-22

## 2023-12-19 RX ORDER — ZINC SULFATE TAB 220 MG (50 MG ZINC EQUIVALENT) 220 (50 ZN) MG
220 TAB ORAL DAILY
Refills: 0 | Status: DISCONTINUED | OUTPATIENT
Start: 2023-12-19 | End: 2023-12-22

## 2023-12-19 RX ORDER — METOPROLOL TARTRATE 50 MG
1 TABLET ORAL
Refills: 0 | DISCHARGE

## 2023-12-19 RX ORDER — METOPROLOL TARTRATE 50 MG
25 TABLET ORAL DAILY
Refills: 0 | Status: DISCONTINUED | OUTPATIENT
Start: 2023-12-19 | End: 2023-12-22

## 2023-12-19 RX ORDER — ACETAMINOPHEN 500 MG
650 TABLET ORAL EVERY 6 HOURS
Refills: 0 | Status: DISCONTINUED | OUTPATIENT
Start: 2023-12-19 | End: 2023-12-22

## 2023-12-19 RX ORDER — SODIUM CHLORIDE 9 MG/ML
1000 INJECTION, SOLUTION INTRAVENOUS
Refills: 0 | Status: DISCONTINUED | OUTPATIENT
Start: 2023-12-19 | End: 2023-12-22

## 2023-12-19 RX ORDER — TAMSULOSIN HYDROCHLORIDE 0.4 MG/1
0.4 CAPSULE ORAL AT BEDTIME
Refills: 0 | Status: DISCONTINUED | OUTPATIENT
Start: 2023-12-19 | End: 2023-12-22

## 2023-12-19 RX ORDER — ATORVASTATIN CALCIUM 80 MG/1
40 TABLET, FILM COATED ORAL AT BEDTIME
Refills: 0 | Status: DISCONTINUED | OUTPATIENT
Start: 2023-12-19 | End: 2023-12-22

## 2023-12-19 RX ORDER — TRAMADOL HYDROCHLORIDE 50 MG/1
1 TABLET ORAL
Refills: 0 | DISCHARGE

## 2023-12-19 RX ORDER — MULTIVIT-MIN/FERROUS GLUCONATE 9 MG/15 ML
1 LIQUID (ML) ORAL DAILY
Refills: 0 | Status: DISCONTINUED | OUTPATIENT
Start: 2023-12-19 | End: 2023-12-22

## 2023-12-19 RX ORDER — POLYETHYLENE GLYCOL 3350 17 G/17G
17 POWDER, FOR SOLUTION ORAL DAILY
Refills: 0 | Status: DISCONTINUED | OUTPATIENT
Start: 2023-12-19 | End: 2023-12-22

## 2023-12-19 RX ORDER — SODIUM CHLORIDE 9 MG/ML
1000 INJECTION INTRAMUSCULAR; INTRAVENOUS; SUBCUTANEOUS ONCE
Refills: 0 | Status: COMPLETED | OUTPATIENT
Start: 2023-12-19 | End: 2023-12-19

## 2023-12-19 RX ORDER — FUROSEMIDE 40 MG
1 TABLET ORAL
Refills: 0 | DISCHARGE

## 2023-12-19 RX ORDER — AMPICILLIN SODIUM AND SULBACTAM SODIUM 250; 125 MG/ML; MG/ML
3 INJECTION, POWDER, FOR SUSPENSION INTRAMUSCULAR; INTRAVENOUS EVERY 6 HOURS
Refills: 0 | Status: DISCONTINUED | OUTPATIENT
Start: 2023-12-19 | End: 2023-12-22

## 2023-12-19 RX ORDER — APIXABAN 2.5 MG/1
5 TABLET, FILM COATED ORAL EVERY 12 HOURS
Refills: 0 | Status: DISCONTINUED | OUTPATIENT
Start: 2023-12-19 | End: 2023-12-21

## 2023-12-19 RX ORDER — ASCORBIC ACID 60 MG
500 TABLET,CHEWABLE ORAL DAILY
Refills: 0 | Status: DISCONTINUED | OUTPATIENT
Start: 2023-12-19 | End: 2023-12-22

## 2023-12-19 RX ORDER — APIXABAN 2.5 MG/1
1 TABLET, FILM COATED ORAL
Refills: 0 | DISCHARGE

## 2023-12-19 RX ORDER — ATORVASTATIN CALCIUM 80 MG/1
1 TABLET, FILM COATED ORAL
Refills: 0 | DISCHARGE

## 2023-12-19 RX ORDER — CEFAZOLIN SODIUM 1 G
2000 VIAL (EA) INJECTION EVERY 8 HOURS
Refills: 0 | Status: DISCONTINUED | OUTPATIENT
Start: 2023-12-19 | End: 2023-12-19

## 2023-12-19 RX ORDER — TRAMADOL HYDROCHLORIDE 50 MG/1
50 TABLET ORAL EVERY 8 HOURS
Refills: 0 | Status: DISCONTINUED | OUTPATIENT
Start: 2023-12-19 | End: 2023-12-22

## 2023-12-19 RX ORDER — FUROSEMIDE 40 MG
40 TABLET ORAL DAILY
Refills: 0 | Status: DISCONTINUED | OUTPATIENT
Start: 2023-12-19 | End: 2023-12-22

## 2023-12-19 RX ORDER — PANTOPRAZOLE SODIUM 20 MG/1
40 TABLET, DELAYED RELEASE ORAL
Refills: 0 | Status: DISCONTINUED | OUTPATIENT
Start: 2023-12-19 | End: 2023-12-22

## 2023-12-19 RX ORDER — TAMSULOSIN HYDROCHLORIDE 0.4 MG/1
1 CAPSULE ORAL
Refills: 0 | DISCHARGE

## 2023-12-19 RX ADMIN — SODIUM CHLORIDE 1000 MILLILITER(S): 9 INJECTION INTRAMUSCULAR; INTRAVENOUS; SUBCUTANEOUS at 16:07

## 2023-12-19 RX ADMIN — SODIUM CHLORIDE 50 MILLILITER(S): 9 INJECTION, SOLUTION INTRAVENOUS at 21:00

## 2023-12-19 RX ADMIN — ATORVASTATIN CALCIUM 40 MILLIGRAM(S): 80 TABLET, FILM COATED ORAL at 21:45

## 2023-12-19 RX ADMIN — Medication 100 MILLIGRAM(S): at 18:44

## 2023-12-19 RX ADMIN — TAMSULOSIN HYDROCHLORIDE 0.4 MILLIGRAM(S): 0.4 CAPSULE ORAL at 21:45

## 2023-12-19 RX ADMIN — APIXABAN 5 MILLIGRAM(S): 2.5 TABLET, FILM COATED ORAL at 21:47

## 2023-12-19 NOTE — ED PROVIDER NOTE - PHYSICAL EXAMINATION
CONSTITUTIONAL: well-appearing, in NAD  SKIN: Warm dry, normal skin turgor, erythematous and edematous left calf with abrasion on lateral calf   HEAD: NCAT  EYES: EOMI, PERRLA, no scleral icterus, conjunctiva pink  ENT: normal pharynx with no erythema or exudates  NECK: Supple; non tender. Full ROM.  CARD: RRR,   RESP: clear to ausculation b/l. No crackles or wheezing.  ABD: soft, non-tender, non-distended, no rebound or guarding.  EXT: Full ROM, no bony tenderness,   NEURO: normal motor. normal sensory.  PSYCH: Cooperative, appropriate. CONSTITUTIONAL: well-appearing, in NAD  SKIN: Warm dry, normal skin turgor, erythematous and edematous left calf with abrasion on lateral calf. No active bleeding. Clear drainage present   HEAD: NCAT  EYES: EOMI, PERRLA, no scleral icterus, conjunctiva pink  ENT: normal pharynx with no erythema or exudates  NECK: Supple; non tender. Full ROM.  CARD: RRR,   RESP: clear to ausculation b/l. No crackles or wheezing.  ABD: soft, non-tender, non-distended, no rebound or guarding.  EXT: Full ROM, no bony tenderness,   NEURO: normal motor. normal sensory.  PSYCH: Cooperative, appropriate.

## 2023-12-19 NOTE — PHYSICAL EXAM
[New] : new [Size%: ______] : Size: [unfilled]% [Infected?] : Infected: Yes [5] : 5 out of 10 [Abnormal] : abnormal [Large] : medium [] : yes [de-identified] : The left lower leg measures 5x5cm and is infected with adherent devitalized tissue .. Excisional debridement with scissors was performed on devitalized tissue down  to and including subcutaneous tissue. A wound culture was obtained. The patient was instructed to clean  the wound with soap and water. Continue local wound care.   Follow up 2 - 4  weeks. Will admit for iv abx and possible debridement  [TWNoteComboBox1] : xeroform

## 2023-12-19 NOTE — HISTORY OF PRESENT ILLNESS
[Did you have an operation on your burn/wound injury?] : Did you have an operation on your burn/wound injury? No [Did this injury occur on the job?] : Did this injury occur on the job? No [de-identified] : home  [de-identified] : infected hematoma left lower leg on elaquis [de-identified] : infected

## 2023-12-19 NOTE — ED ADULT NURSE NOTE - NSFALLUNIVINTERV_ED_ALL_ED
Bed/Stretcher in lowest position, wheels locked, appropriate side rails in place/Call bell, personal items and telephone in reach/Instruct patient to call for assistance before getting out of bed/chair/stretcher/Non-slip footwear applied when patient is off stretcher/[place] to call system/Physically safe environment - no spills, clutter or unnecessary equipment/Purposeful proactive rounding/Room/bathroom lighting operational, light cord in reach Bed/Stretcher in lowest position, wheels locked, appropriate side rails in place/Call bell, personal items and telephone in reach/Instruct patient to call for assistance before getting out of bed/chair/stretcher/Non-slip footwear applied when patient is off stretcher/[place] to call system/Physically safe environment - no spills, clutter or unnecessary equipment/Purposeful proactive rounding/Room/bathroom lighting operational, light cord in reach

## 2023-12-19 NOTE — ED PROVIDER NOTE - MDM ORDERS SUBMITTED SELECTION
Labs/Imaging Studies/Medications Mercedes Flap Text: The defect edges were debeveled with a #15 scalpel blade. Given the location of the defect, shape of the defect and the proximity to free margins a Mercedes flap was deemed most appropriate. Using a sterile surgical marker, an appropriate advancement flap was drawn incorporating the defect and placing the expected incisions within the relaxed skin tension lines where possible. The area thus outlined was incised deep to adipose tissue with a #15 scalpel blade. The skin margins were undermined to an appropriate distance in all directions utilizing iris scissors. Following this, the designed flap was advanced and carried over into the primary defect and sutured into place.

## 2023-12-19 NOTE — ASSESSMENT
[FreeTextEntry1] : The left lower leg measures 5x5cm and is infected with adherent devitalized tissue .. Excisional debridement with scissors was performed on devitalized tissue down  to and including subcutaneous tissue. A wound culture was obtained. The patient was instructed to clean  the wound with soap and water. Continue local wound care.   Follow up 2 - 4  weeks. Will admit for iv abx and possible debridement  [Wound Care] : wound care

## 2023-12-19 NOTE — ED PROVIDER NOTE - CLINICAL SUMMARY MEDICAL DECISION MAKING FREE TEXT BOX
83-year-old male with past medical history of atrial fibrillation on Eliquis, prediabetes presents for infection to left lower extremity and admission for IV antibiotics.  Patient seen here 10 days ago and diagnosed with hematoma after he had a piece of his barbecue.  Had hematoma on CT at that time with possible cellulitis.  Trialed Augmentin and then clindamycin without improvement.  No fever.  Does have worsening swelling.  Did temporarily stop taking his Eliquis.  Saw Dr. Nayak today in burn clinic and told to come to the ED for IV antibiotics and admission.  On exam nontoxic, vital signs noted, left lower extremity edematous below the knee with approximately 4 x 3 cm open wound that is not actively bleeding and has no purulent discharge. RLE with Surrounding erythema nearly circumferentially but no crepitus and no pain out of proportion.  Neurovascular intact distally.  Has failed outpatient trial of treatment.  Requires admission for IV antibiotics.  Clinically no signs of sepsis at this time

## 2023-12-19 NOTE — H&P ADULT - NSHPPHYSICALEXAM_GEN_ALL_CORE
PHYSICAL EXAM:  GENERAL: NAD,   HEAD:  Atraumatic, Normocephalic  CHEST/LUNG: Breathing comfortably on room air, No increased work of breathing noted.   HEART: In no acute cardiopulmonary distress.   PSYCH: AAOx3  SKIN: LLE: anterior aspect full thickness wound ~4cm x3cm, pink and moist base with surrounding erythema and swelling noted. No purulent drainage, malodor, or active bleeding evident.

## 2023-12-19 NOTE — REASON FOR VISIT
[Initial] : initial visit [Were you seen in the Emergency Room?] : seen in the emergency room [Were you admitted to the burn center at [facility]?] : not admitted to the burn center at [facility] [Family Member] : family member

## 2023-12-19 NOTE — H&P ADULT - HISTORY OF PRESENT ILLNESS
Patient is a 83-year-old male with PMH, OA, prediabetes,  A-fib on Eliquis, HTN, presents to the ED for left leg wound that happened a few weeks ago.  Patient reports he bumped his leg and had a skin tear which she saw his PMD and he was also seen again in the ED 12/8 for similar symptoms and given a dose of IV antibiotics.  Patient has also been following up with his PMD and given Augmentin and clindamycin.  Patient has not been improving so he followed up with Dr. Nayak and was told to come to the ED for IV antibiotics and possible debridement.  Patient reports increased pain and swelling of affected area. Patient  denies fever, chills, abdominal pain, nausea, vomiting, diarrhea, SOB, or CP.     Patient is a 83-year-old male with PMH, OA, prediabetes,  A-fib on Eliquis, HTN, HLD, BPH , PSH of CABG w/ stents presents to the ED for infected left leg wound that happened a few weeks ago.  Patient reports he bumped his leg on a chair and had a skin tear which she saw his PMD and he was also seen again in the ED 12/8 for similar symptoms and given a dose of IV antibiotics.  Patient has also been following up with his PMD and given Augmentin for 10 days which he completed and currently on clindamycin.  Patient has not been improving so he followed up with Dr. Nayak and was told to come to the ED for IV antibiotics and possible debridement.  Patient reports increased pain and swelling of affected area. Patient  denies fever, chills, abdominal pain, nausea, vomiting, diarrhea, SOB, or CP.     Patient is a 83-year-old male with PMH, OA, prediabetes,  A-fib on Eliquis, HTN, HLD, BPH , PSH of CABG w/ stents presents to the ED for infected traumatic left leg wound that happened a few weeks ago.  Patient reports he bumped his leg on a chair and had a skin tear which she saw his PMD and he was also seen again in the ED 12/8 for similar symptoms and given a dose of IV antibiotics.  Patient states he has also been following up with his PMD and given Augmentin for 10 days which he completed and currently on clindamycin.  Patient reports that he has seen improvement so he followed up with Dr. Nayak and was told to come to the ED for IV antibiotics and possible debridement.  Patient reports increased pain, redness and swelling of affected area. Patient  denies fever, chills, abdominal pain, nausea, vomiting, diarrhea, SOB, or CP.

## 2023-12-19 NOTE — H&P ADULT - NSHPLABSRESULTS_GEN_ALL_CORE
11.9   9.94  )-----------( 312      ( 19 Dec 2023 16:12 )             36.0     12-19    141  |  99  |  12  ----------------------------<  92  3.9   |  28  |  1.0    Ca    9.0      19 Dec 2023 16:12  Phos  3.1     12-19  Mg     2.0     12-19    TPro  6.7  /  Alb  4.1  /  TBili  0.6  /  DBili  x   /  AST  17  /  ALT  16  /  AlkPhos  85  12-19

## 2023-12-19 NOTE — H&P ADULT - ASSESSMENT
Patient is a 83-year-old male with PMH, OA, prediabetes,  A-fib on Eliquis, HTN, HLD, BPH , PSH of CABG w/ stents presents to the ED for infected left leg wound that happened a few weeks ago.    #Infected traumatic LLE wound   -Admit to burn unit  - IV fluid  - IV antibiotics - Unasyn  - Wound care twice a day - wash with soap and water. Apply bacitracin, cover with adaptic/burn pads and Spandage.   - Pain control   - PT consult  - Activity - ambulate as tolerated    # Cards  -Hx of HTN, HLD, Afib, CABG w/ stents, LE edema  -Continue home medications Eliquis 5mg BID, Lipitor 40mg HS, Zetia 10mg QD, Metroprolol 25mg QD, LAsix 40mg QD  -Monitor VS  -DASH/TLC diet     #Ortho  -Hx of OA  -Continue home medication prednisone 5mg QD, Tramadol 50mg PRN    #  -Hx of BPH  Continue home medications Flomax 0.4mg HS    #Misc  - GI ppx - pantoprazole 40mg daily  - DVT ppx - Eliquis  & BLE compression sequentials   - Multivitamin, Vit C & Zinc for wound healing    Plan of care discussed with patient. Concerns addressed.    Patient is a 83-year-old male with PMH, OA, prediabetes,  A-fib on Eliquis, HTN, HLD, BPH , PSH of CABG w/ stents presents to the ED for infected left leg wound that happened a few weeks ago.    #Infected traumatic LLE wound   -Admit to burn unit  - IV fluid  - IV antibiotics - Unasyn  - Wound care twice a day - wash with soap and water. Apply bacitracin, cover with adaptic/burn pads and Spandage.   - Pain control   - PT consult  - Activity - ambulate as tolerated    # Cards  -Hx of HTN, HLD, Afib, CABG w/ stents, LE edema  -Continue home medications Eliquis 5mg BID, Lipitor 40mg HS, Zetia 10mg (not available inpatient) QD, Metroprolol 25mg QD, LAsix 40mg QD  -Monitor VS  -DASH/TLC diet     #Ortho  -Hx of OA  -Continue home medication prednisone 5mg QD, Tramadol 50mg PRN    #  -Hx of BPH  Continue home medications Flomax 0.4mg HS    #Misc  - GI ppx - pantoprazole 40mg daily  - DVT ppx - Eliquis  & RLE compression sequentials   - Multivitamin, Vit C & Zinc for wound healing    Plan of care discussed with patient. Concerns addressed.

## 2023-12-19 NOTE — ED ADULT NURSE NOTE - OBJECTIVE STATEMENT
Pt. sent to the ED for IV antibiotics. Pt. has a wound to the L shin. Pt. denies fever/chills. Pt. denies N/V/D.

## 2023-12-19 NOTE — ED PROVIDER NOTE - OBJECTIVE STATEMENT
Patient is a 83-year-old male with a past medical history of osteoarthritis, A-fib on Eliquis, prediabetes presenting to the ED for a wound check.  Patient was recently seen in the ED on 12/8 for similar symptoms and given a dose of IV antibiotics.  Patient has also been following up with his PMD and given Augmentin and clindamycin.  Patient has not been improving so he followed up with Dr. Nayak and was told to come to the ED for IV antibiotics. Patient is a 83-year-old male with a past medical history of osteoarthritis, A-fib on Eliquis, prediabetes presenting to the ED for a wound check.  Patient was recently seen in the ED on 12/8 for similar symptoms and given a dose of IV antibiotics.  Patient has also been following up with his PMD and given Augmentin and clindamycin.  Patient has not been improving so he followed up with Dr. Nayak and was told to come to the ED for IV antibiotics.    Denies any nausea, vomiting, diarrhea, fevers, chills, pain, shortness of breath, chest pain

## 2023-12-19 NOTE — H&P ADULT - NSICDXPASTMEDICALHX_GEN_ALL_CORE_FT
PAST MEDICAL HISTORY:  BPH (benign prostatic hyperplasia)     Chronic atrial fibrillation     HLD (hyperlipidemia)     HTN (hypertension)     OA (osteoarthritis)

## 2023-12-20 DIAGNOSIS — S80.12XA CONTUSION OF LEFT LOWER LEG, INITIAL ENCOUNTER: ICD-10-CM

## 2023-12-20 DIAGNOSIS — L08.89 OTHER SPECIFIED LOCAL INFECTIONS OF THE SKIN AND SUBCUTANEOUS TISSUE: ICD-10-CM
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
## 2023-12-20 LAB
ANION GAP SERPL CALC-SCNC: 12 MMOL/L — SIGNIFICANT CHANGE UP (ref 7–14)
ANION GAP SERPL CALC-SCNC: 12 MMOL/L — SIGNIFICANT CHANGE UP (ref 7–14)
BASOPHILS # BLD AUTO: 0.01 K/UL — SIGNIFICANT CHANGE UP (ref 0–0.2)
BASOPHILS # BLD AUTO: 0.01 K/UL — SIGNIFICANT CHANGE UP (ref 0–0.2)
BASOPHILS NFR BLD AUTO: 0.1 % — SIGNIFICANT CHANGE UP (ref 0–1)
BASOPHILS NFR BLD AUTO: 0.1 % — SIGNIFICANT CHANGE UP (ref 0–1)
BUN SERPL-MCNC: 8 MG/DL — LOW (ref 10–20)
BUN SERPL-MCNC: 8 MG/DL — LOW (ref 10–20)
CALCIUM SERPL-MCNC: 8.7 MG/DL — SIGNIFICANT CHANGE UP (ref 8.4–10.5)
CALCIUM SERPL-MCNC: 8.7 MG/DL — SIGNIFICANT CHANGE UP (ref 8.4–10.5)
CHLORIDE SERPL-SCNC: 98 MMOL/L — SIGNIFICANT CHANGE UP (ref 98–110)
CHLORIDE SERPL-SCNC: 98 MMOL/L — SIGNIFICANT CHANGE UP (ref 98–110)
CO2 SERPL-SCNC: 28 MMOL/L — SIGNIFICANT CHANGE UP (ref 17–32)
CO2 SERPL-SCNC: 28 MMOL/L — SIGNIFICANT CHANGE UP (ref 17–32)
CREAT SERPL-MCNC: 0.9 MG/DL — SIGNIFICANT CHANGE UP (ref 0.7–1.5)
CREAT SERPL-MCNC: 0.9 MG/DL — SIGNIFICANT CHANGE UP (ref 0.7–1.5)
EGFR: 86 ML/MIN/1.73M2 — SIGNIFICANT CHANGE UP
EGFR: 86 ML/MIN/1.73M2 — SIGNIFICANT CHANGE UP
EOSINOPHIL # BLD AUTO: 0.06 K/UL — SIGNIFICANT CHANGE UP (ref 0–0.7)
EOSINOPHIL # BLD AUTO: 0.06 K/UL — SIGNIFICANT CHANGE UP (ref 0–0.7)
EOSINOPHIL NFR BLD AUTO: 0.6 % — SIGNIFICANT CHANGE UP (ref 0–8)
EOSINOPHIL NFR BLD AUTO: 0.6 % — SIGNIFICANT CHANGE UP (ref 0–8)
GLUCOSE SERPL-MCNC: 147 MG/DL — HIGH (ref 70–99)
GLUCOSE SERPL-MCNC: 147 MG/DL — HIGH (ref 70–99)
HCT VFR BLD CALC: 33.9 % — LOW (ref 42–52)
HCT VFR BLD CALC: 33.9 % — LOW (ref 42–52)
HGB BLD-MCNC: 11.2 G/DL — LOW (ref 14–18)
HGB BLD-MCNC: 11.2 G/DL — LOW (ref 14–18)
IMM GRANULOCYTES NFR BLD AUTO: 0.3 % — SIGNIFICANT CHANGE UP (ref 0.1–0.3)
IMM GRANULOCYTES NFR BLD AUTO: 0.3 % — SIGNIFICANT CHANGE UP (ref 0.1–0.3)
LYMPHOCYTES # BLD AUTO: 0.82 K/UL — LOW (ref 1.2–3.4)
LYMPHOCYTES # BLD AUTO: 0.82 K/UL — LOW (ref 1.2–3.4)
LYMPHOCYTES # BLD AUTO: 8.5 % — LOW (ref 20.5–51.1)
LYMPHOCYTES # BLD AUTO: 8.5 % — LOW (ref 20.5–51.1)
MAGNESIUM SERPL-MCNC: 2.2 MG/DL — SIGNIFICANT CHANGE UP (ref 1.8–2.4)
MAGNESIUM SERPL-MCNC: 2.2 MG/DL — SIGNIFICANT CHANGE UP (ref 1.8–2.4)
MCHC RBC-ENTMCNC: 32.8 PG — HIGH (ref 27–31)
MCHC RBC-ENTMCNC: 32.8 PG — HIGH (ref 27–31)
MCHC RBC-ENTMCNC: 33 G/DL — SIGNIFICANT CHANGE UP (ref 32–37)
MCHC RBC-ENTMCNC: 33 G/DL — SIGNIFICANT CHANGE UP (ref 32–37)
MCV RBC AUTO: 99.4 FL — HIGH (ref 80–94)
MCV RBC AUTO: 99.4 FL — HIGH (ref 80–94)
MONOCYTES # BLD AUTO: 1 K/UL — HIGH (ref 0.1–0.6)
MONOCYTES # BLD AUTO: 1 K/UL — HIGH (ref 0.1–0.6)
MONOCYTES NFR BLD AUTO: 10.4 % — HIGH (ref 1.7–9.3)
MONOCYTES NFR BLD AUTO: 10.4 % — HIGH (ref 1.7–9.3)
NEUTROPHILS # BLD AUTO: 7.68 K/UL — HIGH (ref 1.4–6.5)
NEUTROPHILS # BLD AUTO: 7.68 K/UL — HIGH (ref 1.4–6.5)
NEUTROPHILS NFR BLD AUTO: 80.1 % — HIGH (ref 42.2–75.2)
NEUTROPHILS NFR BLD AUTO: 80.1 % — HIGH (ref 42.2–75.2)
NRBC # BLD: 0 /100 WBCS — SIGNIFICANT CHANGE UP (ref 0–0)
NRBC # BLD: 0 /100 WBCS — SIGNIFICANT CHANGE UP (ref 0–0)
PHOSPHATE SERPL-MCNC: 2.9 MG/DL — SIGNIFICANT CHANGE UP (ref 2.1–4.9)
PHOSPHATE SERPL-MCNC: 2.9 MG/DL — SIGNIFICANT CHANGE UP (ref 2.1–4.9)
PLATELET # BLD AUTO: 311 K/UL — SIGNIFICANT CHANGE UP (ref 130–400)
PLATELET # BLD AUTO: 311 K/UL — SIGNIFICANT CHANGE UP (ref 130–400)
PMV BLD: 10.7 FL — HIGH (ref 7.4–10.4)
PMV BLD: 10.7 FL — HIGH (ref 7.4–10.4)
POTASSIUM SERPL-MCNC: 3.7 MMOL/L — SIGNIFICANT CHANGE UP (ref 3.5–5)
POTASSIUM SERPL-MCNC: 3.7 MMOL/L — SIGNIFICANT CHANGE UP (ref 3.5–5)
POTASSIUM SERPL-SCNC: 3.7 MMOL/L — SIGNIFICANT CHANGE UP (ref 3.5–5)
POTASSIUM SERPL-SCNC: 3.7 MMOL/L — SIGNIFICANT CHANGE UP (ref 3.5–5)
RBC # BLD: 3.41 M/UL — LOW (ref 4.7–6.1)
RBC # BLD: 3.41 M/UL — LOW (ref 4.7–6.1)
RBC # FLD: 12.7 % — SIGNIFICANT CHANGE UP (ref 11.5–14.5)
RBC # FLD: 12.7 % — SIGNIFICANT CHANGE UP (ref 11.5–14.5)
SODIUM SERPL-SCNC: 138 MMOL/L — SIGNIFICANT CHANGE UP (ref 135–146)
SODIUM SERPL-SCNC: 138 MMOL/L — SIGNIFICANT CHANGE UP (ref 135–146)
WBC # BLD: 9.6 K/UL — SIGNIFICANT CHANGE UP (ref 4.8–10.8)
WBC # BLD: 9.6 K/UL — SIGNIFICANT CHANGE UP (ref 4.8–10.8)
WBC # FLD AUTO: 9.6 K/UL — SIGNIFICANT CHANGE UP (ref 4.8–10.8)
WBC # FLD AUTO: 9.6 K/UL — SIGNIFICANT CHANGE UP (ref 4.8–10.8)

## 2023-12-20 PROCEDURE — 99232 SBSQ HOSP IP/OBS MODERATE 35: CPT | Mod: FS

## 2023-12-20 RX ADMIN — Medication 500 MILLIGRAM(S): at 11:46

## 2023-12-20 RX ADMIN — Medication 250 MILLIGRAM(S): at 05:10

## 2023-12-20 RX ADMIN — AMPICILLIN SODIUM AND SULBACTAM SODIUM 200 GRAM(S): 250; 125 INJECTION, POWDER, FOR SUSPENSION INTRAMUSCULAR; INTRAVENOUS at 23:15

## 2023-12-20 RX ADMIN — SODIUM CHLORIDE 50 MILLILITER(S): 9 INJECTION, SOLUTION INTRAVENOUS at 06:07

## 2023-12-20 RX ADMIN — CHLORHEXIDINE GLUCONATE 1 APPLICATION(S): 213 SOLUTION TOPICAL at 05:11

## 2023-12-20 RX ADMIN — Medication 40 MILLIGRAM(S): at 05:10

## 2023-12-20 RX ADMIN — Medication 1 APPLICATION(S): at 22:58

## 2023-12-20 RX ADMIN — MORPHINE SULFATE 4 MILLIGRAM(S): 50 CAPSULE, EXTENDED RELEASE ORAL at 02:30

## 2023-12-20 RX ADMIN — Medication 5 MILLIGRAM(S): at 05:10

## 2023-12-20 RX ADMIN — TAMSULOSIN HYDROCHLORIDE 0.4 MILLIGRAM(S): 0.4 CAPSULE ORAL at 21:06

## 2023-12-20 RX ADMIN — Medication 25 MILLIGRAM(S): at 21:03

## 2023-12-20 RX ADMIN — ZINC SULFATE TAB 220 MG (50 MG ZINC EQUIVALENT) 220 MILLIGRAM(S): 220 (50 ZN) TAB at 11:46

## 2023-12-20 RX ADMIN — SENNA PLUS 2 TABLET(S): 8.6 TABLET ORAL at 21:03

## 2023-12-20 RX ADMIN — APIXABAN 5 MILLIGRAM(S): 2.5 TABLET, FILM COATED ORAL at 17:03

## 2023-12-20 RX ADMIN — APIXABAN 5 MILLIGRAM(S): 2.5 TABLET, FILM COATED ORAL at 05:10

## 2023-12-20 RX ADMIN — Medication 1 APPLICATION(S): at 21:04

## 2023-12-20 RX ADMIN — ATORVASTATIN CALCIUM 40 MILLIGRAM(S): 80 TABLET, FILM COATED ORAL at 21:04

## 2023-12-20 RX ADMIN — Medication 250 MILLIGRAM(S): at 17:03

## 2023-12-20 RX ADMIN — Medication 1 TABLET(S): at 11:46

## 2023-12-20 RX ADMIN — AMPICILLIN SODIUM AND SULBACTAM SODIUM 200 GRAM(S): 250; 125 INJECTION, POWDER, FOR SUSPENSION INTRAMUSCULAR; INTRAVENOUS at 06:07

## 2023-12-20 RX ADMIN — AMPICILLIN SODIUM AND SULBACTAM SODIUM 200 GRAM(S): 250; 125 INJECTION, POWDER, FOR SUSPENSION INTRAMUSCULAR; INTRAVENOUS at 11:46

## 2023-12-20 RX ADMIN — Medication 25 MILLIGRAM(S): at 05:11

## 2023-12-20 RX ADMIN — MORPHINE SULFATE 4 MILLIGRAM(S): 50 CAPSULE, EXTENDED RELEASE ORAL at 03:00

## 2023-12-20 RX ADMIN — AMPICILLIN SODIUM AND SULBACTAM SODIUM 200 GRAM(S): 250; 125 INJECTION, POWDER, FOR SUSPENSION INTRAMUSCULAR; INTRAVENOUS at 02:15

## 2023-12-20 RX ADMIN — AMPICILLIN SODIUM AND SULBACTAM SODIUM 200 GRAM(S): 250; 125 INJECTION, POWDER, FOR SUSPENSION INTRAMUSCULAR; INTRAVENOUS at 17:02

## 2023-12-20 RX ADMIN — PANTOPRAZOLE SODIUM 40 MILLIGRAM(S): 20 TABLET, DELAYED RELEASE ORAL at 05:10

## 2023-12-20 NOTE — CONSULT NOTE ADULT - SUBJECTIVE AND OBJECTIVE BOX
CRISTOPHER FRANCIS  83y, Male  Allergy: No Known Allergies      CHIEF COMPLAINT: LLE wound (19 Dec 2023 19:01)      LOS  1d    HPI:  Patient is a 83-year-old male with PMH, OA, prediabetes,  A-fib on Eliquis, HTN, HLD, BPH , PSH of CABG w/ stents presents to the ED for infected traumatic left leg wound that happened a few weeks ago.  Patient reports he bumped his leg on a chair and had a skin tear which she saw his PMD and he was also seen again in the ED 12/8 for similar symptoms and given a dose of IV antibiotics.  Patient states he has also been following up with his PMD and given Augmentin for 10 days which he completed and currently on clindamycin.  Patient reports that he has seen improvement so he followed up with Dr. Nayak and was told to come to the ED for IV antibiotics and possible debridement.  Patient reports increased pain, redness and swelling of affected area. Patient  denies fever, chills, abdominal pain, nausea, vomiting, diarrhea, SOB, or CP.     (19 Dec 2023 19:01)      INFECTIOUS DISEASE HISTORY:  History as above.    PAST MEDICAL & SURGICAL HISTORY:  Chronic atrial fibrillation      HTN (hypertension)      HLD (hyperlipidemia)      BPH (benign prostatic hyperplasia)      OA (osteoarthritis)          FAMILY HISTORY      SOCIAL HISTORY  Social History:        ROS  General: Denies rigors, nightsweats  HEENT: Denies headache, rhinorrhea, sore throat, eye pain  CV: Denies CP, palpitations  PULM: Denies wheezing, hemoptysis  GI: Denies hematemesis, hematochezia, melena  : Denies discharge, hematuria  MSK: Denies arthralgias, myalgias  SKIN: Denies rash, lesions  NEURO: Denies paresthesias, weakness  PSYCH: Denies depression, anxiety    VITALS:  T(F): 97.7, Max: 98.4 (12-20-23 @ 00:52)  HR: 84  BP: 112/52  RR: 18Vital Signs Last 24 Hrs  T(C): 36.5 (20 Dec 2023 02:15), Max: 36.9 (20 Dec 2023 00:52)  T(F): 97.7 (20 Dec 2023 02:15), Max: 98.4 (20 Dec 2023 00:52)  HR: 84 (20 Dec 2023 05:00) (76 - 90)  BP: 112/52 (20 Dec 2023 05:00) (112/52 - 132/63)  BP(mean): --  RR: 18 (20 Dec 2023 02:15) (16 - 18)  SpO2: 96% (20 Dec 2023 02:15) (94% - 96%)    Parameters below as of 20 Dec 2023 00:52  Patient On (Oxygen Delivery Method): room air        PHYSICAL EXAM:  Gen: NAD, resting in bed  HEENT: Normocephalic, atraumatic  Neck: supple, no lymphadenopathy  CV: Regular rate & regular rhythm  Lungs: decreased BS at bases, no fremitus  Abdomen: Soft, BS present  Ext: Warm, well perfused  Neuro: non focal, awake  Skin: no rash, no erythema  Lines: no phlebitis    TESTS & MEASUREMENTS:                        11.9   9.94  )-----------( 312      ( 19 Dec 2023 16:12 )             36.0     12-19    141  |  99  |  12  ----------------------------<  92  3.9   |  28  |  1.0    Ca    9.0      19 Dec 2023 16:12  Phos  3.1     12-19  Mg     2.0     12-19    TPro  6.7  /  Alb  4.1  /  TBili  0.6  /  DBili  x   /  AST  17  /  ALT  16  /  AlkPhos  85  12-19      LIVER FUNCTIONS - ( 19 Dec 2023 16:12 )  Alb: 4.1 g/dL / Pro: 6.7 g/dL / ALK PHOS: 85 U/L / ALT: 16 U/L / AST: 17 U/L / GGT: x           Urinalysis Basic - ( 19 Dec 2023 16:12 )    Color: x / Appearance: x / SG: x / pH: x  Gluc: 92 mg/dL / Ketone: x  / Bili: x / Urobili: x   Blood: x / Protein: x / Nitrite: x   Leuk Esterase: x / RBC: x / WBC x   Sq Epi: x / Non Sq Epi: x / Bacteria: x          Lactate, Blood: 1.2 mmol/L (12-19-23 @ 16:12)      INFECTIOUS DISEASES TESTING      RADIOLOGY & ADDITIONAL TESTS:  I have personally reviewed the last Chest xray  CXR      CT      CARDIOLOGY TESTING      MEDICATIONS  ampicillin/sulbactam  IVPB 3 IV Intermittent every 6 hours  apixaban 5 Oral every 12 hours  ascorbic acid 500 Oral daily  atorvastatin 40 Oral at bedtime  bacitracin   Ointment 1 Topical two times a day  chlorhexidine 4% Liquid 1 Topical <User Schedule>  furosemide    Tablet 40 Oral daily  lactated ringers. 1000 IV Continuous <Continuous>  metoprolol succinate ER 25 Oral daily  multivitamin/minerals 1 Oral daily  pantoprazole    Tablet 40 Oral before breakfast  predniSONE   Tablet 5 Oral daily  saccharomyces boulardii 250 Oral two times a day  senna 2 Oral at bedtime  tamsulosin 0.4 Oral at bedtime  zinc sulfate 220 Oral daily      Weight  Weight (kg): 127.9 (12-19-23 @ 14:56)    ANTIBIOTICS:  ampicillin/sulbactam  IVPB 3 Gram(s) IV Intermittent every 6 hours      ALLERGIES:  No Known Allergies       CRISTOPHER FRANCIS  83y, Male  Allergy: No Known Allergies      CHIEF COMPLAINT: LLE wound (19 Dec 2023 19:01)      LOS  1d    HPI:  Patient is a 83-year-old male with PMH, OA, prediabetes,  A-fib on Eliquis, HTN, HLD, BPH , PSH of CABG w/ stents presents to the ED for infected traumatic left leg wound that happened a few weeks ago.  Patient reports he bumped his leg on a chair and had a skin tear which she saw his PMD and he was also seen again in the ED 12/8 for similar symptoms and given a dose of IV antibiotics.  Patient states he has also been following up with his PMD and given Augmentin for 10 days which he completed and currently on clindamycin.  Patient reports that he has seen improvement so he followed up with Dr. Nayak and was told to come to the ED for IV antibiotics and possible debridement.  Patient reports increased pain, redness and swelling of affected area. Patient  denies fever, chills, abdominal pain, nausea, vomiting, diarrhea, SOB, or CP.     (19 Dec 2023 19:01)      INFECTIOUS DISEASE HISTORY:  History as above.  CT Left LE with hematoma     PAST MEDICAL & SURGICAL HISTORY:  Chronic atrial fibrillation      HTN (hypertension)      HLD (hyperlipidemia)      BPH (benign prostatic hyperplasia)      OA (osteoarthritis)          FAMILY HISTORY      SOCIAL HISTORY  Social History:        ROS  General: Denies rigors, nightsweats  HEENT: Denies headache, rhinorrhea, sore throat, eye pain  CV: Denies CP, palpitations  PULM: Denies wheezing, hemoptysis  GI: Denies hematemesis, hematochezia, melena  : Denies discharge, hematuria  MSK: Denies arthralgias, myalgias  SKIN: Denies rash, lesions  NEURO: Denies paresthesias, weakness  PSYCH: Denies depression, anxiety    VITALS:  T(F): 97.7, Max: 98.4 (12-20-23 @ 00:52)  HR: 84  BP: 112/52  RR: 18Vital Signs Last 24 Hrs  T(C): 36.5 (20 Dec 2023 02:15), Max: 36.9 (20 Dec 2023 00:52)  T(F): 97.7 (20 Dec 2023 02:15), Max: 98.4 (20 Dec 2023 00:52)  HR: 84 (20 Dec 2023 05:00) (76 - 90)  BP: 112/52 (20 Dec 2023 05:00) (112/52 - 132/63)  BP(mean): --  RR: 18 (20 Dec 2023 02:15) (16 - 18)  SpO2: 96% (20 Dec 2023 02:15) (94% - 96%)    Parameters below as of 20 Dec 2023 00:52  Patient On (Oxygen Delivery Method): room air        PHYSICAL EXAM:  Gen: NAD, resting in bed  HEENT: Normocephalic, atraumatic  Neck: supple, no lymphadenopathy  CV: Regular rate & regular rhythm  Lungs: decreased BS at bases, no fremitus  Abdomen: Soft, BS present  Ext: Warm, well perfused  Neuro: non focal, awake  Skin: LLE with full thickness wound -- hematoma on CT imaging - surrounding erythema     Lines: no phlebitis    TESTS & MEASUREMENTS:                        11.9   9.94  )-----------( 312      ( 19 Dec 2023 16:12 )             36.0     12-19    141  |  99  |  12  ----------------------------<  92  3.9   |  28  |  1.0    Ca    9.0      19 Dec 2023 16:12  Phos  3.1     12-19  Mg     2.0     12-19    TPro  6.7  /  Alb  4.1  /  TBili  0.6  /  DBili  x   /  AST  17  /  ALT  16  /  AlkPhos  85  12-19      LIVER FUNCTIONS - ( 19 Dec 2023 16:12 )  Alb: 4.1 g/dL / Pro: 6.7 g/dL / ALK PHOS: 85 U/L / ALT: 16 U/L / AST: 17 U/L / GGT: x           Urinalysis Basic - ( 19 Dec 2023 16:12 )    Color: x / Appearance: x / SG: x / pH: x  Gluc: 92 mg/dL / Ketone: x  / Bili: x / Urobili: x   Blood: x / Protein: x / Nitrite: x   Leuk Esterase: x / RBC: x / WBC x   Sq Epi: x / Non Sq Epi: x / Bacteria: x          Lactate, Blood: 1.2 mmol/L (12-19-23 @ 16:12)      INFECTIOUS DISEASES TESTING      RADIOLOGY & ADDITIONAL TESTS:  I have personally reviewed the last Chest xray  CXR      CT      CARDIOLOGY TESTING      MEDICATIONS  ampicillin/sulbactam  IVPB 3 IV Intermittent every 6 hours  apixaban 5 Oral every 12 hours  ascorbic acid 500 Oral daily  atorvastatin 40 Oral at bedtime  bacitracin   Ointment 1 Topical two times a day  chlorhexidine 4% Liquid 1 Topical <User Schedule>  furosemide    Tablet 40 Oral daily  lactated ringers. 1000 IV Continuous <Continuous>  metoprolol succinate ER 25 Oral daily  multivitamin/minerals 1 Oral daily  pantoprazole    Tablet 40 Oral before breakfast  predniSONE   Tablet 5 Oral daily  saccharomyces boulardii 250 Oral two times a day  senna 2 Oral at bedtime  tamsulosin 0.4 Oral at bedtime  zinc sulfate 220 Oral daily      Weight  Weight (kg): 127.9 (12-19-23 @ 14:56)    ANTIBIOTICS:  ampicillin/sulbactam  IVPB 3 Gram(s) IV Intermittent every 6 hours      ALLERGIES:  No Known Allergies

## 2023-12-20 NOTE — PHYSICAL THERAPY INITIAL EVALUATION ADULT - GENERAL OBSERVATIONS, REHAB EVAL
BICU. [id number] pm. 82 y/o M rec'd/left in bed, nad, + IV Abx, LT leg drsg/+edema, wife present. pt is A+Ox4, motivated, agreeable for PT evaluation. transferred with CS, ambulated 40 ft x 2 with RW, CS, negotiated 2 steps with CG. pt pending debridement surgery on 12/22. BICU. [id number] pm. 82 y/o M rec'd/left in bed, nad, + IV Abx, LT leg drsg/+edema, wife present. pt is A+Ox4, motivated, agreeable for PT evaluation. transferred with CS, ambulated 40 ft x 2 with RW, CS, negotiated 2 steps with CG. pt pending debridement surgery on 12/22.

## 2023-12-20 NOTE — CONSULT NOTE ADULT - ASSESSMENT
ASSESSMENT  83-year-old male with PMH, OA, prediabetes,  A-fib on Eliquis, HTN, HLD, BPH , PSH of CABG w/ stents presents to the ED for infected traumatic left leg wound that happened a few weeks ago    IMPRESSION  #Left Lower extremity wound  #Atrial Fibrillation  #CAD s/p CABG  #Obesity BMI (kg/m2): 40.5, 40.6  #Abx allergy: No Known Allergies    RECOMMENDATIONS  This is a preliminary incomplete pended note, all final recommendations to follow after interview and examination of the patient.    Please call or message on [employer] Teams if with any questions.  Spectra [phone]     ASSESSMENT  83-year-old male with PMH, OA, prediabetes,  A-fib on Eliquis, HTN, HLD, BPH , PSH of CABG w/ stents presents to the ED for infected traumatic left leg wound that happened a few weeks ago    IMPRESSION  #Left Lower extremity wound  #Atrial Fibrillation  #CAD s/p CABG  #Obesity BMI (kg/m2): 40.5, 40.6  #Abx allergy: No Known Allergies    RECOMMENDATIONS  This is a preliminary incomplete pended note, all final recommendations to follow after interview and examination of the patient.    Please call or message on [employer] Teams if with any questions.  Spectra [phone]     ASSESSMENT  83-year-old male with PMH, OA, prediabetes,  A-fib on Eliquis, HTN, HLD, BPH , PSH of CABG w/ stents presents to the ED for infected traumatic left leg wound that happened a few weeks ago    IMPRESSION  #Left Lower extremity wound with hematoma/cellulitis   - CT Lower Extremity w/ IV Cont, Left (12.08.23 @ 18:49): 1.  Focal 5.7 cm hyperdense fluid collection of the anterolateral left  lower extremity, most compatible with a hematoma. No evidence of contrast   extravasation. 2.  Diffuse circumferential subcutaneous edema and fat stranding of the  left lower extremity. Correlate for cellulitis. 3.  Superficial skin ulceration of the anterolateral left lower extremity. 4.  No evidence of osseous erosions.    #Atrial Fibrillation  #CAD s/p CABG  #Obesity BMI (kg/m2): 40.5, 40.6  #Abx allergy: No Known Allergies    RECOMMENDATIONS  - continue unasyn 3g q 6 hours   - check MRSA nares to see if colonized   - will follow plans for any hematoma evacuation/debridement     Please call or message on [employer] Teams if with any questions.  Spectra [phone]     ASSESSMENT  83-year-old male with PMH, OA, prediabetes,  A-fib on Eliquis, HTN, HLD, BPH , PSH of CABG w/ stents presents to the ED for infected traumatic left leg wound that happened a few weeks ago    IMPRESSION  #Left Lower extremity wound with hematoma/cellulitis   - CT Lower Extremity w/ IV Cont, Left (12.08.23 @ 18:49): 1.  Focal 5.7 cm hyperdense fluid collection of the anterolateral left  lower extremity, most compatible with a hematoma. No evidence of contrast   extravasation. 2.  Diffuse circumferential subcutaneous edema and fat stranding of the  left lower extremity. Correlate for cellulitis. 3.  Superficial skin ulceration of the anterolateral left lower extremity. 4.  No evidence of osseous erosions.    #Atrial Fibrillation  #CAD s/p CABG  #Obesity BMI (kg/m2): 40.5, 40.6  #Abx allergy: No Known Allergies    RECOMMENDATIONS  - continue unasyn 3g q 6 hours   - check MRSA nares to see if colonized   - will follow plans for any hematoma evacuation/debridement     Please call or message on [employer] Teams if with any questions.  Spectra [phone]

## 2023-12-20 NOTE — PHYSICAL THERAPY INITIAL EVALUATION ADULT - PERTINENT HX OF CURRENT PROBLEM, REHAB EVAL
83-year-old male with PMH, OA, prediabetes,  A-fib on Eliquis, HTN, HLD, BPH , PSH of CABG w/ stents presents to the ED for infected traumatic left leg wound that happened a few weeks ago.  Patient reports he bumped his leg on a chair and had a skin tear which she saw his PMD and he was also seen again in the ED 12/8 for similar symptoms and given a dose of IV antibiotics.  Patient states he has also been following up with his PMD and given Augmentin for 10 days which he completed and currently on clindamycin.  Patient reports that he has seen improvement so he followed up with Dr. Nayak and was told to come to the ED for IV antibiotics and possible debridement.  Patient reports increased pain, redness and swelling of affected area. Patient  denies fever, chills, abdominal pain, nausea, vomiting, diarrhea, SOB, or CP.

## 2023-12-20 NOTE — PATIENT PROFILE ADULT - FALL HARM RISK - UNIVERSAL INTERVENTIONS
Bed in lowest position, wheels locked, appropriate side rails in place/Call bell, personal items and telephone in reach/Instruct patient to call for assistance before getting out of bed or chair/Non-slip footwear when patient is out of bed/[place] to call system/Physically safe environment - no spills, clutter or unnecessary equipment/Purposeful Proactive Rounding/Room/bathroom lighting operational, light cord in reach Bed in lowest position, wheels locked, appropriate side rails in place/Call bell, personal items and telephone in reach/Instruct patient to call for assistance before getting out of bed or chair/Non-slip footwear when patient is out of bed/[place] to call system/Physically safe environment - no spills, clutter or unnecessary equipment/Purposeful Proactive Rounding/Room/bathroom lighting operational, light cord in reach

## 2023-12-20 NOTE — PROGRESS NOTE ADULT - ASSESSMENT
Patient is a 83-year-old male with PMH, OA, prediabetes,  A-fib on Eliquis, HTN, HLD, BPH , PSH of CABG w/ stents presents to the ED for infected left leg wound that happened a few weeks ago.    #Infected traumatic LLE wound   - IV fluid  - IV antibiotics per ID- continue unasyn 3g q 6 hours check MRSA nares to see if colonized (ordered)  -Venous duplex BLE 12/19: No evidence of deep venous thrombosis in either lower extremity. Limited visualization of the bilateral calf veins.   - Wound care twice a day - wash with soap and water. Apply bacitracin, cover with adaptic/burn pads and Spandage.   - Pain control   - PT consult  - Activity - ambulate as tolerated    # Cards  -Hx of HTN, HLD, Afib, CABG w/ stents, LE edema  -Continue home medications Eliquis 5mg BID, Lipitor 40mg HS, Zetia 10mg (not available inpatient) QD, Metroprolol 25mg QD, LAsix 40mg QD  -Monitor VS  -DASH/TLC diet     #Ortho  -Hx of OA  -Continue home medication prednisone 5mg QD, Tramadol 50mg PRN    #  -Hx of BPH  Continue home medications Flomax 0.4mg HS    #Misc  - GI ppx - pantoprazole 40mg daily  - DVT ppx - Eliquis  & RLE compression sequentials   - Multivitamin, Vit C & Zinc for wound healing    Plan of care discussed with patient. Concerns addressed.

## 2023-12-21 ENCOUNTER — TRANSCRIPTION ENCOUNTER (OUTPATIENT)
Age: 83
End: 2023-12-21

## 2023-12-21 LAB
ANION GAP SERPL CALC-SCNC: 10 MMOL/L — SIGNIFICANT CHANGE UP (ref 7–14)
ANION GAP SERPL CALC-SCNC: 10 MMOL/L — SIGNIFICANT CHANGE UP (ref 7–14)
BASOPHILS # BLD AUTO: 0.02 K/UL — SIGNIFICANT CHANGE UP (ref 0–0.2)
BASOPHILS # BLD AUTO: 0.02 K/UL — SIGNIFICANT CHANGE UP (ref 0–0.2)
BASOPHILS NFR BLD AUTO: 0.2 % — SIGNIFICANT CHANGE UP (ref 0–1)
BASOPHILS NFR BLD AUTO: 0.2 % — SIGNIFICANT CHANGE UP (ref 0–1)
BUN SERPL-MCNC: 9 MG/DL — LOW (ref 10–20)
BUN SERPL-MCNC: 9 MG/DL — LOW (ref 10–20)
CALCIUM SERPL-MCNC: 8.9 MG/DL — SIGNIFICANT CHANGE UP (ref 8.4–10.5)
CALCIUM SERPL-MCNC: 8.9 MG/DL — SIGNIFICANT CHANGE UP (ref 8.4–10.5)
CHLORIDE SERPL-SCNC: 101 MMOL/L — SIGNIFICANT CHANGE UP (ref 98–110)
CHLORIDE SERPL-SCNC: 101 MMOL/L — SIGNIFICANT CHANGE UP (ref 98–110)
CO2 SERPL-SCNC: 31 MMOL/L — SIGNIFICANT CHANGE UP (ref 17–32)
CO2 SERPL-SCNC: 31 MMOL/L — SIGNIFICANT CHANGE UP (ref 17–32)
CREAT SERPL-MCNC: 0.9 MG/DL — SIGNIFICANT CHANGE UP (ref 0.7–1.5)
CREAT SERPL-MCNC: 0.9 MG/DL — SIGNIFICANT CHANGE UP (ref 0.7–1.5)
EGFR: 86 ML/MIN/1.73M2 — SIGNIFICANT CHANGE UP
EGFR: 86 ML/MIN/1.73M2 — SIGNIFICANT CHANGE UP
EOSINOPHIL # BLD AUTO: 0.05 K/UL — SIGNIFICANT CHANGE UP (ref 0–0.7)
EOSINOPHIL # BLD AUTO: 0.05 K/UL — SIGNIFICANT CHANGE UP (ref 0–0.7)
EOSINOPHIL NFR BLD AUTO: 0.5 % — SIGNIFICANT CHANGE UP (ref 0–8)
EOSINOPHIL NFR BLD AUTO: 0.5 % — SIGNIFICANT CHANGE UP (ref 0–8)
GLUCOSE BLDC GLUCOMTR-MCNC: 136 MG/DL — HIGH (ref 70–99)
GLUCOSE BLDC GLUCOMTR-MCNC: 136 MG/DL — HIGH (ref 70–99)
GLUCOSE BLDC GLUCOMTR-MCNC: 141 MG/DL — HIGH (ref 70–99)
GLUCOSE BLDC GLUCOMTR-MCNC: 141 MG/DL — HIGH (ref 70–99)
GLUCOSE SERPL-MCNC: 123 MG/DL — HIGH (ref 70–99)
GLUCOSE SERPL-MCNC: 123 MG/DL — HIGH (ref 70–99)
HCT VFR BLD CALC: 38.1 % — LOW (ref 42–52)
HCT VFR BLD CALC: 38.1 % — LOW (ref 42–52)
HGB BLD-MCNC: 12.3 G/DL — LOW (ref 14–18)
HGB BLD-MCNC: 12.3 G/DL — LOW (ref 14–18)
IMM GRANULOCYTES NFR BLD AUTO: 0.3 % — SIGNIFICANT CHANGE UP (ref 0.1–0.3)
IMM GRANULOCYTES NFR BLD AUTO: 0.3 % — SIGNIFICANT CHANGE UP (ref 0.1–0.3)
LYMPHOCYTES # BLD AUTO: 0.83 K/UL — LOW (ref 1.2–3.4)
LYMPHOCYTES # BLD AUTO: 0.83 K/UL — LOW (ref 1.2–3.4)
LYMPHOCYTES # BLD AUTO: 7.9 % — LOW (ref 20.5–51.1)
LYMPHOCYTES # BLD AUTO: 7.9 % — LOW (ref 20.5–51.1)
MAGNESIUM SERPL-MCNC: 2.3 MG/DL — SIGNIFICANT CHANGE UP (ref 1.8–2.4)
MAGNESIUM SERPL-MCNC: 2.3 MG/DL — SIGNIFICANT CHANGE UP (ref 1.8–2.4)
MCHC RBC-ENTMCNC: 32.3 G/DL — SIGNIFICANT CHANGE UP (ref 32–37)
MCHC RBC-ENTMCNC: 32.3 G/DL — SIGNIFICANT CHANGE UP (ref 32–37)
MCHC RBC-ENTMCNC: 32.4 PG — HIGH (ref 27–31)
MCHC RBC-ENTMCNC: 32.4 PG — HIGH (ref 27–31)
MCV RBC AUTO: 100.3 FL — HIGH (ref 80–94)
MCV RBC AUTO: 100.3 FL — HIGH (ref 80–94)
MONOCYTES # BLD AUTO: 1 K/UL — HIGH (ref 0.1–0.6)
MONOCYTES # BLD AUTO: 1 K/UL — HIGH (ref 0.1–0.6)
MONOCYTES NFR BLD AUTO: 9.5 % — HIGH (ref 1.7–9.3)
MONOCYTES NFR BLD AUTO: 9.5 % — HIGH (ref 1.7–9.3)
MRSA PCR RESULT.: NEGATIVE — SIGNIFICANT CHANGE UP
MRSA PCR RESULT.: NEGATIVE — SIGNIFICANT CHANGE UP
NEUTROPHILS # BLD AUTO: 8.59 K/UL — HIGH (ref 1.4–6.5)
NEUTROPHILS # BLD AUTO: 8.59 K/UL — HIGH (ref 1.4–6.5)
NEUTROPHILS NFR BLD AUTO: 81.6 % — HIGH (ref 42.2–75.2)
NEUTROPHILS NFR BLD AUTO: 81.6 % — HIGH (ref 42.2–75.2)
NRBC # BLD: 0 /100 WBCS — SIGNIFICANT CHANGE UP (ref 0–0)
NRBC # BLD: 0 /100 WBCS — SIGNIFICANT CHANGE UP (ref 0–0)
PHOSPHATE SERPL-MCNC: 2.4 MG/DL — SIGNIFICANT CHANGE UP (ref 2.1–4.9)
PHOSPHATE SERPL-MCNC: 2.4 MG/DL — SIGNIFICANT CHANGE UP (ref 2.1–4.9)
PLATELET # BLD AUTO: 352 K/UL — SIGNIFICANT CHANGE UP (ref 130–400)
PLATELET # BLD AUTO: 352 K/UL — SIGNIFICANT CHANGE UP (ref 130–400)
PMV BLD: 10.5 FL — HIGH (ref 7.4–10.4)
PMV BLD: 10.5 FL — HIGH (ref 7.4–10.4)
POTASSIUM SERPL-MCNC: 3.7 MMOL/L — SIGNIFICANT CHANGE UP (ref 3.5–5)
POTASSIUM SERPL-MCNC: 3.7 MMOL/L — SIGNIFICANT CHANGE UP (ref 3.5–5)
POTASSIUM SERPL-SCNC: 3.7 MMOL/L — SIGNIFICANT CHANGE UP (ref 3.5–5)
POTASSIUM SERPL-SCNC: 3.7 MMOL/L — SIGNIFICANT CHANGE UP (ref 3.5–5)
RBC # BLD: 3.8 M/UL — LOW (ref 4.7–6.1)
RBC # BLD: 3.8 M/UL — LOW (ref 4.7–6.1)
RBC # FLD: 12.7 % — SIGNIFICANT CHANGE UP (ref 11.5–14.5)
RBC # FLD: 12.7 % — SIGNIFICANT CHANGE UP (ref 11.5–14.5)
SODIUM SERPL-SCNC: 142 MMOL/L — SIGNIFICANT CHANGE UP (ref 135–146)
SODIUM SERPL-SCNC: 142 MMOL/L — SIGNIFICANT CHANGE UP (ref 135–146)
WBC # BLD: 10.52 K/UL — SIGNIFICANT CHANGE UP (ref 4.8–10.8)
WBC # BLD: 10.52 K/UL — SIGNIFICANT CHANGE UP (ref 4.8–10.8)
WBC # FLD AUTO: 10.52 K/UL — SIGNIFICANT CHANGE UP (ref 4.8–10.8)
WBC # FLD AUTO: 10.52 K/UL — SIGNIFICANT CHANGE UP (ref 4.8–10.8)

## 2023-12-21 RX ADMIN — AMPICILLIN SODIUM AND SULBACTAM SODIUM 200 GRAM(S): 250; 125 INJECTION, POWDER, FOR SUSPENSION INTRAMUSCULAR; INTRAVENOUS at 05:21

## 2023-12-21 RX ADMIN — APIXABAN 5 MILLIGRAM(S): 2.5 TABLET, FILM COATED ORAL at 05:41

## 2023-12-21 RX ADMIN — TAMSULOSIN HYDROCHLORIDE 0.4 MILLIGRAM(S): 0.4 CAPSULE ORAL at 21:01

## 2023-12-21 RX ADMIN — Medication 250 MILLIGRAM(S): at 17:03

## 2023-12-21 RX ADMIN — AMPICILLIN SODIUM AND SULBACTAM SODIUM 200 GRAM(S): 250; 125 INJECTION, POWDER, FOR SUSPENSION INTRAMUSCULAR; INTRAVENOUS at 17:04

## 2023-12-21 RX ADMIN — ZINC SULFATE TAB 220 MG (50 MG ZINC EQUIVALENT) 220 MILLIGRAM(S): 220 (50 ZN) TAB at 12:01

## 2023-12-21 RX ADMIN — ATORVASTATIN CALCIUM 40 MILLIGRAM(S): 80 TABLET, FILM COATED ORAL at 21:01

## 2023-12-21 RX ADMIN — Medication 1 APPLICATION(S): at 21:02

## 2023-12-21 RX ADMIN — PANTOPRAZOLE SODIUM 40 MILLIGRAM(S): 20 TABLET, DELAYED RELEASE ORAL at 05:22

## 2023-12-21 RX ADMIN — AMPICILLIN SODIUM AND SULBACTAM SODIUM 200 GRAM(S): 250; 125 INJECTION, POWDER, FOR SUSPENSION INTRAMUSCULAR; INTRAVENOUS at 12:01

## 2023-12-21 RX ADMIN — Medication 250 MILLIGRAM(S): at 05:22

## 2023-12-21 RX ADMIN — Medication 5 MILLIGRAM(S): at 05:21

## 2023-12-21 RX ADMIN — Medication 500 MILLIGRAM(S): at 12:01

## 2023-12-21 RX ADMIN — Medication 1 APPLICATION(S): at 05:23

## 2023-12-21 RX ADMIN — AMPICILLIN SODIUM AND SULBACTAM SODIUM 200 GRAM(S): 250; 125 INJECTION, POWDER, FOR SUSPENSION INTRAMUSCULAR; INTRAVENOUS at 23:00

## 2023-12-21 RX ADMIN — CHLORHEXIDINE GLUCONATE 1 APPLICATION(S): 213 SOLUTION TOPICAL at 05:22

## 2023-12-21 RX ADMIN — Medication 25 MILLIGRAM(S): at 21:02

## 2023-12-21 RX ADMIN — SENNA PLUS 2 TABLET(S): 8.6 TABLET ORAL at 21:02

## 2023-12-21 RX ADMIN — Medication 40 MILLIGRAM(S): at 05:22

## 2023-12-21 RX ADMIN — Medication 1 TABLET(S): at 12:01

## 2023-12-21 NOTE — DISCHARGE NOTE NURSING/CASE MANAGEMENT/SOCIAL WORK - NSDCPEFALRISK_GEN_ALL_CORE
For information on Fall & Injury Prevention, visit: [email or web address] OR  [email or web address] OR  https://www.cdc.gov/steadi/patient.html For information on Fall & Injury Prevention, visit: [email or web address] OR  [email or web address] OR  https://www.cdc.gov/steadi/patient.html

## 2023-12-21 NOTE — DISCHARGE NOTE NURSING/CASE MANAGEMENT/SOCIAL WORK - PATIENT PORTAL LINK FT
You can access the FollowMyHealth Patient Portal offered by [facility] by registering at the following website: [email or web address]. By joining [employer]’s FollowMyHealth portal, you will also be able to view your health information using other applications (apps) compatible with our system. You can access the FollowMyHealth Patient Portal offered by [facility] by registering at the following website: [email or web address]. By joining [employer]’s FollowMyHealth portal, you will also be able to view your health information using other applications (apps) compatible with our system.

## 2023-12-22 ENCOUNTER — RESULT REVIEW (OUTPATIENT)
Age: 83
End: 2023-12-22

## 2023-12-22 ENCOUNTER — TRANSCRIPTION ENCOUNTER (OUTPATIENT)
Age: 83
End: 2023-12-22

## 2023-12-22 LAB
ANION GAP SERPL CALC-SCNC: 11 MMOL/L — SIGNIFICANT CHANGE UP (ref 7–14)
ANION GAP SERPL CALC-SCNC: 11 MMOL/L — SIGNIFICANT CHANGE UP (ref 7–14)
BASOPHILS # BLD AUTO: 0.02 K/UL — SIGNIFICANT CHANGE UP (ref 0–0.2)
BASOPHILS # BLD AUTO: 0.02 K/UL — SIGNIFICANT CHANGE UP (ref 0–0.2)
BASOPHILS NFR BLD AUTO: 0.2 % — SIGNIFICANT CHANGE UP (ref 0–1)
BASOPHILS NFR BLD AUTO: 0.2 % — SIGNIFICANT CHANGE UP (ref 0–1)
BUN SERPL-MCNC: 11 MG/DL — SIGNIFICANT CHANGE UP (ref 10–20)
BUN SERPL-MCNC: 11 MG/DL — SIGNIFICANT CHANGE UP (ref 10–20)
CALCIUM SERPL-MCNC: 8.9 MG/DL — SIGNIFICANT CHANGE UP (ref 8.4–10.5)
CALCIUM SERPL-MCNC: 8.9 MG/DL — SIGNIFICANT CHANGE UP (ref 8.4–10.5)
CHLORIDE SERPL-SCNC: 104 MMOL/L — SIGNIFICANT CHANGE UP (ref 98–110)
CHLORIDE SERPL-SCNC: 104 MMOL/L — SIGNIFICANT CHANGE UP (ref 98–110)
CO2 SERPL-SCNC: 31 MMOL/L — SIGNIFICANT CHANGE UP (ref 17–32)
CO2 SERPL-SCNC: 31 MMOL/L — SIGNIFICANT CHANGE UP (ref 17–32)
CREAT SERPL-MCNC: 0.8 MG/DL — SIGNIFICANT CHANGE UP (ref 0.7–1.5)
CREAT SERPL-MCNC: 0.8 MG/DL — SIGNIFICANT CHANGE UP (ref 0.7–1.5)
EGFR: 89 ML/MIN/1.73M2 — SIGNIFICANT CHANGE UP
EGFR: 89 ML/MIN/1.73M2 — SIGNIFICANT CHANGE UP
EOSINOPHIL # BLD AUTO: 0.03 K/UL — SIGNIFICANT CHANGE UP (ref 0–0.7)
EOSINOPHIL # BLD AUTO: 0.03 K/UL — SIGNIFICANT CHANGE UP (ref 0–0.7)
EOSINOPHIL NFR BLD AUTO: 0.3 % — SIGNIFICANT CHANGE UP (ref 0–8)
EOSINOPHIL NFR BLD AUTO: 0.3 % — SIGNIFICANT CHANGE UP (ref 0–8)
GLUCOSE BLDC GLUCOMTR-MCNC: 114 MG/DL — HIGH (ref 70–99)
GLUCOSE BLDC GLUCOMTR-MCNC: 114 MG/DL — HIGH (ref 70–99)
GLUCOSE BLDC GLUCOMTR-MCNC: 123 MG/DL — HIGH (ref 70–99)
GLUCOSE BLDC GLUCOMTR-MCNC: 123 MG/DL — HIGH (ref 70–99)
GLUCOSE SERPL-MCNC: 125 MG/DL — HIGH (ref 70–99)
GLUCOSE SERPL-MCNC: 125 MG/DL — HIGH (ref 70–99)
HCT VFR BLD CALC: 37.8 % — LOW (ref 42–52)
HCT VFR BLD CALC: 37.8 % — LOW (ref 42–52)
HGB BLD-MCNC: 12.3 G/DL — LOW (ref 14–18)
HGB BLD-MCNC: 12.3 G/DL — LOW (ref 14–18)
IMM GRANULOCYTES NFR BLD AUTO: 0.4 % — HIGH (ref 0.1–0.3)
IMM GRANULOCYTES NFR BLD AUTO: 0.4 % — HIGH (ref 0.1–0.3)
LYMPHOCYTES # BLD AUTO: 0.86 K/UL — LOW (ref 1.2–3.4)
LYMPHOCYTES # BLD AUTO: 0.86 K/UL — LOW (ref 1.2–3.4)
LYMPHOCYTES # BLD AUTO: 8.4 % — LOW (ref 20.5–51.1)
LYMPHOCYTES # BLD AUTO: 8.4 % — LOW (ref 20.5–51.1)
MAGNESIUM SERPL-MCNC: 2.5 MG/DL — HIGH (ref 1.8–2.4)
MAGNESIUM SERPL-MCNC: 2.5 MG/DL — HIGH (ref 1.8–2.4)
MCHC RBC-ENTMCNC: 32.5 G/DL — SIGNIFICANT CHANGE UP (ref 32–37)
MCHC RBC-ENTMCNC: 32.5 G/DL — SIGNIFICANT CHANGE UP (ref 32–37)
MCHC RBC-ENTMCNC: 32.5 PG — HIGH (ref 27–31)
MCHC RBC-ENTMCNC: 32.5 PG — HIGH (ref 27–31)
MCV RBC AUTO: 99.7 FL — HIGH (ref 80–94)
MCV RBC AUTO: 99.7 FL — HIGH (ref 80–94)
MONOCYTES # BLD AUTO: 0.79 K/UL — HIGH (ref 0.1–0.6)
MONOCYTES # BLD AUTO: 0.79 K/UL — HIGH (ref 0.1–0.6)
MONOCYTES NFR BLD AUTO: 7.8 % — SIGNIFICANT CHANGE UP (ref 1.7–9.3)
MONOCYTES NFR BLD AUTO: 7.8 % — SIGNIFICANT CHANGE UP (ref 1.7–9.3)
NEUTROPHILS # BLD AUTO: 8.44 K/UL — HIGH (ref 1.4–6.5)
NEUTROPHILS # BLD AUTO: 8.44 K/UL — HIGH (ref 1.4–6.5)
NEUTROPHILS NFR BLD AUTO: 82.9 % — HIGH (ref 42.2–75.2)
NEUTROPHILS NFR BLD AUTO: 82.9 % — HIGH (ref 42.2–75.2)
NRBC # BLD: 0 /100 WBCS — SIGNIFICANT CHANGE UP (ref 0–0)
NRBC # BLD: 0 /100 WBCS — SIGNIFICANT CHANGE UP (ref 0–0)
PHOSPHATE SERPL-MCNC: 3 MG/DL — SIGNIFICANT CHANGE UP (ref 2.1–4.9)
PHOSPHATE SERPL-MCNC: 3 MG/DL — SIGNIFICANT CHANGE UP (ref 2.1–4.9)
PLATELET # BLD AUTO: 354 K/UL — SIGNIFICANT CHANGE UP (ref 130–400)
PLATELET # BLD AUTO: 354 K/UL — SIGNIFICANT CHANGE UP (ref 130–400)
PMV BLD: 10.3 FL — SIGNIFICANT CHANGE UP (ref 7.4–10.4)
PMV BLD: 10.3 FL — SIGNIFICANT CHANGE UP (ref 7.4–10.4)
POTASSIUM SERPL-MCNC: 3.9 MMOL/L — SIGNIFICANT CHANGE UP (ref 3.5–5)
POTASSIUM SERPL-MCNC: 3.9 MMOL/L — SIGNIFICANT CHANGE UP (ref 3.5–5)
POTASSIUM SERPL-SCNC: 3.9 MMOL/L — SIGNIFICANT CHANGE UP (ref 3.5–5)
POTASSIUM SERPL-SCNC: 3.9 MMOL/L — SIGNIFICANT CHANGE UP (ref 3.5–5)
RBC # BLD: 3.79 M/UL — LOW (ref 4.7–6.1)
RBC # BLD: 3.79 M/UL — LOW (ref 4.7–6.1)
RBC # FLD: 12.7 % — SIGNIFICANT CHANGE UP (ref 11.5–14.5)
RBC # FLD: 12.7 % — SIGNIFICANT CHANGE UP (ref 11.5–14.5)
SODIUM SERPL-SCNC: 146 MMOL/L — SIGNIFICANT CHANGE UP (ref 135–146)
SODIUM SERPL-SCNC: 146 MMOL/L — SIGNIFICANT CHANGE UP (ref 135–146)
WBC # BLD: 10.18 K/UL — SIGNIFICANT CHANGE UP (ref 4.8–10.8)
WBC # BLD: 10.18 K/UL — SIGNIFICANT CHANGE UP (ref 4.8–10.8)
WBC # FLD AUTO: 10.18 K/UL — SIGNIFICANT CHANGE UP (ref 4.8–10.8)
WBC # FLD AUTO: 10.18 K/UL — SIGNIFICANT CHANGE UP (ref 4.8–10.8)

## 2023-12-22 PROCEDURE — 99232 SBSQ HOSP IP/OBS MODERATE 35: CPT | Mod: FS,25

## 2023-12-22 PROCEDURE — 11046 DBRDMT MUSC&/FSCA EA ADDL: CPT

## 2023-12-22 PROCEDURE — 88304 TISSUE EXAM BY PATHOLOGIST: CPT | Mod: 26

## 2023-12-22 PROCEDURE — 11043 DBRDMT MUSC&/FSCA 1ST 20/<: CPT

## 2023-12-22 RX ORDER — LANOLIN ALCOHOL/MO/W.PET/CERES
5 CREAM (GRAM) TOPICAL AT BEDTIME
Refills: 0 | Status: DISCONTINUED | OUTPATIENT
Start: 2023-12-22 | End: 2023-12-24

## 2023-12-22 RX ORDER — ACETAMINOPHEN 500 MG
650 TABLET ORAL EVERY 6 HOURS
Refills: 0 | Status: DISCONTINUED | OUTPATIENT
Start: 2023-12-22 | End: 2023-12-24

## 2023-12-22 RX ORDER — SENNA PLUS 8.6 MG/1
2 TABLET ORAL AT BEDTIME
Refills: 0 | Status: DISCONTINUED | OUTPATIENT
Start: 2023-12-22 | End: 2023-12-24

## 2023-12-22 RX ORDER — ZINC SULFATE TAB 220 MG (50 MG ZINC EQUIVALENT) 220 (50 ZN) MG
220 TAB ORAL DAILY
Refills: 0 | Status: DISCONTINUED | OUTPATIENT
Start: 2023-12-22 | End: 2023-12-24

## 2023-12-22 RX ORDER — METOPROLOL TARTRATE 50 MG
25 TABLET ORAL DAILY
Refills: 0 | Status: DISCONTINUED | OUTPATIENT
Start: 2023-12-22 | End: 2023-12-24

## 2023-12-22 RX ORDER — GENTAMICIN SULFATE 0.1 %
1 OINTMENT (GRAM) TOPICAL
Refills: 0 | Status: DISCONTINUED | OUTPATIENT
Start: 2023-12-22 | End: 2023-12-24

## 2023-12-22 RX ORDER — OXYCODONE AND ACETAMINOPHEN 5; 325 MG/1; MG/1
1 TABLET ORAL EVERY 6 HOURS
Refills: 0 | Status: DISCONTINUED | OUTPATIENT
Start: 2023-12-22 | End: 2023-12-24

## 2023-12-22 RX ORDER — MORPHINE SULFATE 50 MG/1
4 CAPSULE, EXTENDED RELEASE ORAL
Refills: 0 | Status: DISCONTINUED | OUTPATIENT
Start: 2023-12-22 | End: 2023-12-22

## 2023-12-22 RX ORDER — FUROSEMIDE 40 MG
40 TABLET ORAL DAILY
Refills: 0 | Status: DISCONTINUED | OUTPATIENT
Start: 2023-12-22 | End: 2023-12-24

## 2023-12-22 RX ORDER — ATORVASTATIN CALCIUM 80 MG/1
40 TABLET, FILM COATED ORAL AT BEDTIME
Refills: 0 | Status: DISCONTINUED | OUTPATIENT
Start: 2023-12-22 | End: 2023-12-24

## 2023-12-22 RX ORDER — POLYETHYLENE GLYCOL 3350 17 G/17G
17 POWDER, FOR SOLUTION ORAL DAILY
Refills: 0 | Status: DISCONTINUED | OUTPATIENT
Start: 2023-12-22 | End: 2023-12-24

## 2023-12-22 RX ORDER — TRAMADOL HYDROCHLORIDE 50 MG/1
50 TABLET ORAL EVERY 6 HOURS
Refills: 0 | Status: DISCONTINUED | OUTPATIENT
Start: 2023-12-22 | End: 2023-12-24

## 2023-12-22 RX ORDER — MORPHINE SULFATE 50 MG/1
4 CAPSULE, EXTENDED RELEASE ORAL
Refills: 0 | Status: DISCONTINUED | OUTPATIENT
Start: 2023-12-22 | End: 2023-12-24

## 2023-12-22 RX ORDER — TAMSULOSIN HYDROCHLORIDE 0.4 MG/1
0.4 CAPSULE ORAL AT BEDTIME
Refills: 0 | Status: DISCONTINUED | OUTPATIENT
Start: 2023-12-22 | End: 2023-12-24

## 2023-12-22 RX ORDER — LANOLIN ALCOHOL/MO/W.PET/CERES
5 CREAM (GRAM) TOPICAL AT BEDTIME
Refills: 0 | Status: DISCONTINUED | OUTPATIENT
Start: 2023-12-22 | End: 2023-12-22

## 2023-12-22 RX ORDER — TRAMADOL HYDROCHLORIDE 50 MG/1
50 TABLET ORAL EVERY 8 HOURS
Refills: 0 | Status: DISCONTINUED | OUTPATIENT
Start: 2023-12-22 | End: 2023-12-22

## 2023-12-22 RX ORDER — SACCHAROMYCES BOULARDII 250 MG
250 POWDER IN PACKET (EA) ORAL
Refills: 0 | Status: DISCONTINUED | OUTPATIENT
Start: 2023-12-22 | End: 2023-12-24

## 2023-12-22 RX ORDER — MULTIVIT-MIN/FERROUS GLUCONATE 9 MG/15 ML
1 LIQUID (ML) ORAL DAILY
Refills: 0 | Status: DISCONTINUED | OUTPATIENT
Start: 2023-12-22 | End: 2023-12-24

## 2023-12-22 RX ORDER — ASCORBIC ACID 60 MG
500 TABLET,CHEWABLE ORAL DAILY
Refills: 0 | Status: DISCONTINUED | OUTPATIENT
Start: 2023-12-22 | End: 2023-12-24

## 2023-12-22 RX ORDER — CHLORHEXIDINE GLUCONATE 213 G/1000ML
1 SOLUTION TOPICAL
Refills: 0 | Status: DISCONTINUED | OUTPATIENT
Start: 2023-12-22 | End: 2023-12-24

## 2023-12-22 RX ORDER — SODIUM CHLORIDE 9 MG/ML
1000 INJECTION, SOLUTION INTRAVENOUS
Refills: 0 | Status: DISCONTINUED | OUTPATIENT
Start: 2023-12-22 | End: 2023-12-22

## 2023-12-22 RX ORDER — APIXABAN 2.5 MG/1
5 TABLET, FILM COATED ORAL EVERY 12 HOURS
Refills: 0 | Status: DISCONTINUED | OUTPATIENT
Start: 2023-12-23 | End: 2023-12-24

## 2023-12-22 RX ORDER — PANTOPRAZOLE SODIUM 20 MG/1
40 TABLET, DELAYED RELEASE ORAL
Refills: 0 | Status: DISCONTINUED | OUTPATIENT
Start: 2023-12-22 | End: 2023-12-24

## 2023-12-22 RX ORDER — DIPHENHYDRAMINE HCL 50 MG
25 CAPSULE ORAL ONCE
Refills: 0 | Status: DISCONTINUED | OUTPATIENT
Start: 2023-12-22 | End: 2023-12-24

## 2023-12-22 RX ORDER — AMPICILLIN SODIUM AND SULBACTAM SODIUM 250; 125 MG/ML; MG/ML
3 INJECTION, POWDER, FOR SUSPENSION INTRAMUSCULAR; INTRAVENOUS EVERY 6 HOURS
Refills: 0 | Status: DISCONTINUED | OUTPATIENT
Start: 2023-12-22 | End: 2023-12-24

## 2023-12-22 RX ADMIN — Medication 5 MILLIGRAM(S): at 22:03

## 2023-12-22 RX ADMIN — PANTOPRAZOLE SODIUM 40 MILLIGRAM(S): 20 TABLET, DELAYED RELEASE ORAL at 05:46

## 2023-12-22 RX ADMIN — ZINC SULFATE TAB 220 MG (50 MG ZINC EQUIVALENT) 220 MILLIGRAM(S): 220 (50 ZN) TAB at 13:06

## 2023-12-22 RX ADMIN — AMPICILLIN SODIUM AND SULBACTAM SODIUM 200 GRAM(S): 250; 125 INJECTION, POWDER, FOR SUSPENSION INTRAMUSCULAR; INTRAVENOUS at 23:17

## 2023-12-22 RX ADMIN — Medication 250 MILLIGRAM(S): at 05:46

## 2023-12-22 RX ADMIN — AMPICILLIN SODIUM AND SULBACTAM SODIUM 200 GRAM(S): 250; 125 INJECTION, POWDER, FOR SUSPENSION INTRAMUSCULAR; INTRAVENOUS at 17:35

## 2023-12-22 RX ADMIN — Medication 5 MILLIGRAM(S): at 05:46

## 2023-12-22 RX ADMIN — Medication 250 MILLIGRAM(S): at 17:35

## 2023-12-22 RX ADMIN — AMPICILLIN SODIUM AND SULBACTAM SODIUM 200 GRAM(S): 250; 125 INJECTION, POWDER, FOR SUSPENSION INTRAMUSCULAR; INTRAVENOUS at 05:46

## 2023-12-22 RX ADMIN — TAMSULOSIN HYDROCHLORIDE 0.4 MILLIGRAM(S): 0.4 CAPSULE ORAL at 22:03

## 2023-12-22 RX ADMIN — MORPHINE SULFATE 4 MILLIGRAM(S): 50 CAPSULE, EXTENDED RELEASE ORAL at 16:00

## 2023-12-22 RX ADMIN — Medication 500 MILLIGRAM(S): at 13:00

## 2023-12-22 RX ADMIN — CHLORHEXIDINE GLUCONATE 1 APPLICATION(S): 213 SOLUTION TOPICAL at 05:47

## 2023-12-22 RX ADMIN — MORPHINE SULFATE 4 MILLIGRAM(S): 50 CAPSULE, EXTENDED RELEASE ORAL at 15:50

## 2023-12-22 RX ADMIN — Medication 1 TABLET(S): at 13:00

## 2023-12-22 RX ADMIN — AMPICILLIN SODIUM AND SULBACTAM SODIUM 200 GRAM(S): 250; 125 INJECTION, POWDER, FOR SUSPENSION INTRAMUSCULAR; INTRAVENOUS at 13:01

## 2023-12-22 RX ADMIN — ATORVASTATIN CALCIUM 40 MILLIGRAM(S): 80 TABLET, FILM COATED ORAL at 22:03

## 2023-12-22 RX ADMIN — Medication 40 MILLIGRAM(S): at 05:47

## 2023-12-22 NOTE — PROGRESS NOTE ADULT - ASSESSMENT
Patient is a 83-year-old male with PMH, OA, prediabetes,  A-fib on Eliquis, HTN, HLD, BPH , PSH of CABG w/ stents presents to the ED for infected left leg wound that happened a few weeks ago.    #Infected traumatic LLE wound   - Plan for OR today  - IV fluid  - IV antibiotics per ID- continue unasyn 3g q 6 hours check MRSA nares to see if colonized (ordered)  -Venous duplex BLE 12/19: No evidence of deep venous thrombosis in either lower extremity. Limited visualization of the bilateral calf veins.   - Wound care twice a day - wash with soap and water. Apply bacitracin, cover with adaptic/burn pads and Spandage.   - Pain control   - PT consult  - Activity - ambulate as tolerated    # Cards  -Hx of HTN, HLD, Afib, CABG w/ stents, LE edema  -Continue home medications Eliquis 5mg BID, Lipitor 40mg HS, Zetia 10mg (not available inpatient) QD, Metroprolol 25mg QD, LAsix 40mg QD  -Monitor VS  -DASH/TLC diet     #Ortho  -Hx of OA  -Continue home medication prednisone 5mg QD, Tramadol 50mg PRN    #  -Hx of BPH  Continue home medications Flomax 0.4mg HS    #Misc  - GI ppx - pantoprazole 40mg daily  - DVT ppx - Eliquis  & RLE compression sequentials   - Multivitamin, Vit C & Zinc for wound healing

## 2023-12-22 NOTE — BRIEF OPERATIVE NOTE - NSICDXBRIEFPROCEDURE_GEN_ALL_CORE_FT
PROCEDURES:  Selective debridement 22-Dec-2023 15:57:59 excisional debridement with scalpel and evacuation hematoma left lower leg Todd Nayak

## 2023-12-22 NOTE — CHART NOTE - NSCHARTNOTEFT_GEN_A_CORE
Patient is a 83-year-old male with PMH, OA, prediabetes,  A-fib on Eliquis, HTN, HLD, BPH , PSH of CABG w/ stents is here for infected traumatic left leg wound that requires debridement. Patient will need a rolling walker to assist with ambulation.

## 2023-12-22 NOTE — PROGRESS NOTE ADULT - ASSESSMENT
WIC form for [employer] completed and in fax tray. Please fax to requested WIC office.      ASSESSMENT  83-year-old male with PMH, OA, prediabetes,  A-fib on Eliquis, HTN, HLD, BPH , PSH of CABG w/ stents presents to the ED for infected traumatic left leg wound that happened a few weeks ago    IMPRESSION  #Left Lower extremity wound with hematoma/cellulitis  - CT Lower Extremity w/ IV Cont, Left (12.08.23 @ 18:49): 1.  Focal 5.7 cm hyperdense fluid collection of the anterolateral left  lower extremity, most compatible with a hematoma. No evidence of contrast  extravasation. 2.  Diffuse circumferential subcutaneous edema and fat stranding of the  left lower extremity. Correlate for cellulitis. 3.  Superficial skin ulceration of the anterolateral left lower extremity. 4.  No evidence of osseous erosions.  - MRSA nares negative    #Atrial Fibrillation  #CAD s/p CABG  #Obesity BMI (kg/m2): 40.5, 40.6  #Abx allergy: No Known Allergies    RECOMMENDATIONS  - continue unasyn 3g q 6 hours  - planned for add-on for hematoma evacuation/debridement -- will follow-up OR Cx    Please call or message on [employer] Teams if with any questions.  Spectra [phone]     ASSESSMENT  83-year-old male with PMH, OA, prediabetes,  A-fib on Eliquis, HTN, HLD, BPH , PSH of CABG w/ stents presents to the ED for infected traumatic left leg wound that happened a few weeks ago    IMPRESSION  #Left Lower extremity wound with hematoma/cellulitis  - CT Lower Extremity w/ IV Cont, Left (12.08.23 @ 18:49): 1.  Focal 5.7 cm hyperdense fluid collection of the anterolateral left  lower extremity, most compatible with a hematoma. No evidence of contrast  extravasation. 2.  Diffuse circumferential subcutaneous edema and fat stranding of the  left lower extremity. Correlate for cellulitis. 3.  Superficial skin ulceration of the anterolateral left lower extremity. 4.  No evidence of osseous erosions.  - MRSA nares negative    #Atrial Fibrillation  #CAD s/p CABG  #Obesity BMI (kg/m2): 40.5, 40.6  #Abx allergy: No Known Allergies    RECOMMENDATIONS  - continue unasyn 3g q 6 hours  - planned for add-on for hematoma evacuation/debridement -- will follow-up OR Cx    Please call or message on [employer] Teams if with any questions.  Spectra [phone]     ASSESSMENT  83-year-old male with PMH, OA, prediabetes,  A-fib on Eliquis, HTN, HLD, BPH , PSH of CABG w/ stents presents to the ED for infected traumatic left leg wound that happened a few weeks ago    IMPRESSION  #Left Lower extremity wound with hematoma/cellulitis  - CT Lower Extremity w/ IV Cont, Left (12.08.23 @ 18:49): 1.  Focal 5.7 cm hyperdense fluid collection of the anterolateral left  lower extremity, most compatible with a hematoma. No evidence of contrast  extravasation. 2.  Diffuse circumferential subcutaneous edema and fat stranding of the  left lower extremity. Correlate for cellulitis. 3.  Superficial skin ulceration of the anterolateral left lower extremity. 4.  No evidence of osseous erosions.  - MRSA nares negative  - s/p debridement 12/22     #Atrial Fibrillation  #CAD s/p CABG  #Obesity BMI (kg/m2): 40.5, 40.6  #Abx allergy: No Known Allergies    RECOMMENDATIONS  - continue unasyn 3g q 6 hours  - on discharge switch to PO augmentin 875/125 mg BID to continue until 10/29    Please call or message on [employer] Teams if with any questions.  Spectra [phone]     ASSESSMENT  83-year-old male with PMH, OA, prediabetes,  A-fib on Eliquis, HTN, HLD, BPH , PSH of CABG w/ stents presents to the ED for infected traumatic left leg wound that happened a few weeks ago    IMPRESSION  #Left Lower extremity wound with hematoma/cellulitis  - CT Lower Extremity w/ IV Cont, Left (12.08.23 @ 18:49): 1.  Focal 5.7 cm hyperdense fluid collection of the anterolateral left  lower extremity, most compatible with a hematoma. No evidence of contrast  extravasation. 2.  Diffuse circumferential subcutaneous edema and fat stranding of the  left lower extremity. Correlate for cellulitis. 3.  Superficial skin ulceration of the anterolateral left lower extremity. 4.  No evidence of osseous erosions.  - MRSA nares negative  - s/p debridement 12/22     #Atrial Fibrillation  #CAD s/p CABG  #Obesity BMI (kg/m2): 40.5, 40.6  #Abx allergy: No Known Allergies    RECOMMENDATIONS  - continue unasyn 3g q 6 hours  - on discharge switch to PO augmentin 875/125 mg BID to continue until 10/29    Please call or message on [employer] Teams if with any questions.  Spectra [phone]

## 2023-12-23 LAB
ANION GAP SERPL CALC-SCNC: 12 MMOL/L — SIGNIFICANT CHANGE UP (ref 7–14)
ANION GAP SERPL CALC-SCNC: 12 MMOL/L — SIGNIFICANT CHANGE UP (ref 7–14)
BASOPHILS # BLD AUTO: 0.03 K/UL — SIGNIFICANT CHANGE UP (ref 0–0.2)
BASOPHILS # BLD AUTO: 0.03 K/UL — SIGNIFICANT CHANGE UP (ref 0–0.2)
BASOPHILS NFR BLD AUTO: 0.3 % — SIGNIFICANT CHANGE UP (ref 0–1)
BASOPHILS NFR BLD AUTO: 0.3 % — SIGNIFICANT CHANGE UP (ref 0–1)
BUN SERPL-MCNC: 13 MG/DL — SIGNIFICANT CHANGE UP (ref 10–20)
BUN SERPL-MCNC: 13 MG/DL — SIGNIFICANT CHANGE UP (ref 10–20)
CALCIUM SERPL-MCNC: 9 MG/DL — SIGNIFICANT CHANGE UP (ref 8.4–10.5)
CALCIUM SERPL-MCNC: 9 MG/DL — SIGNIFICANT CHANGE UP (ref 8.4–10.5)
CHLORIDE SERPL-SCNC: 101 MMOL/L — SIGNIFICANT CHANGE UP (ref 98–110)
CHLORIDE SERPL-SCNC: 101 MMOL/L — SIGNIFICANT CHANGE UP (ref 98–110)
CO2 SERPL-SCNC: 30 MMOL/L — SIGNIFICANT CHANGE UP (ref 17–32)
CO2 SERPL-SCNC: 30 MMOL/L — SIGNIFICANT CHANGE UP (ref 17–32)
CREAT SERPL-MCNC: 1 MG/DL — SIGNIFICANT CHANGE UP (ref 0.7–1.5)
CREAT SERPL-MCNC: 1 MG/DL — SIGNIFICANT CHANGE UP (ref 0.7–1.5)
CULTURE RESULTS: ABNORMAL
CULTURE RESULTS: ABNORMAL
EGFR: 76 ML/MIN/1.73M2 — SIGNIFICANT CHANGE UP
EGFR: 76 ML/MIN/1.73M2 — SIGNIFICANT CHANGE UP
EOSINOPHIL # BLD AUTO: 0.06 K/UL — SIGNIFICANT CHANGE UP (ref 0–0.7)
EOSINOPHIL # BLD AUTO: 0.06 K/UL — SIGNIFICANT CHANGE UP (ref 0–0.7)
EOSINOPHIL NFR BLD AUTO: 0.5 % — SIGNIFICANT CHANGE UP (ref 0–8)
EOSINOPHIL NFR BLD AUTO: 0.5 % — SIGNIFICANT CHANGE UP (ref 0–8)
GLUCOSE BLDC GLUCOMTR-MCNC: 134 MG/DL — HIGH (ref 70–99)
GLUCOSE BLDC GLUCOMTR-MCNC: 134 MG/DL — HIGH (ref 70–99)
GLUCOSE SERPL-MCNC: 123 MG/DL — HIGH (ref 70–99)
GLUCOSE SERPL-MCNC: 123 MG/DL — HIGH (ref 70–99)
GRAM STN FLD: SIGNIFICANT CHANGE UP
GRAM STN FLD: SIGNIFICANT CHANGE UP
HCT VFR BLD CALC: 39.1 % — LOW (ref 42–52)
HCT VFR BLD CALC: 39.1 % — LOW (ref 42–52)
HGB BLD-MCNC: 12.7 G/DL — LOW (ref 14–18)
HGB BLD-MCNC: 12.7 G/DL — LOW (ref 14–18)
IMM GRANULOCYTES NFR BLD AUTO: 0.3 % — SIGNIFICANT CHANGE UP (ref 0.1–0.3)
IMM GRANULOCYTES NFR BLD AUTO: 0.3 % — SIGNIFICANT CHANGE UP (ref 0.1–0.3)
LYMPHOCYTES # BLD AUTO: 0.88 K/UL — LOW (ref 1.2–3.4)
LYMPHOCYTES # BLD AUTO: 0.88 K/UL — LOW (ref 1.2–3.4)
LYMPHOCYTES # BLD AUTO: 7.4 % — LOW (ref 20.5–51.1)
LYMPHOCYTES # BLD AUTO: 7.4 % — LOW (ref 20.5–51.1)
MAGNESIUM SERPL-MCNC: 2.3 MG/DL — SIGNIFICANT CHANGE UP (ref 1.8–2.4)
MAGNESIUM SERPL-MCNC: 2.3 MG/DL — SIGNIFICANT CHANGE UP (ref 1.8–2.4)
MCHC RBC-ENTMCNC: 32.4 PG — HIGH (ref 27–31)
MCHC RBC-ENTMCNC: 32.4 PG — HIGH (ref 27–31)
MCHC RBC-ENTMCNC: 32.5 G/DL — SIGNIFICANT CHANGE UP (ref 32–37)
MCHC RBC-ENTMCNC: 32.5 G/DL — SIGNIFICANT CHANGE UP (ref 32–37)
MCV RBC AUTO: 99.7 FL — HIGH (ref 80–94)
MCV RBC AUTO: 99.7 FL — HIGH (ref 80–94)
MONOCYTES # BLD AUTO: 1.12 K/UL — HIGH (ref 0.1–0.6)
MONOCYTES # BLD AUTO: 1.12 K/UL — HIGH (ref 0.1–0.6)
MONOCYTES NFR BLD AUTO: 9.4 % — HIGH (ref 1.7–9.3)
MONOCYTES NFR BLD AUTO: 9.4 % — HIGH (ref 1.7–9.3)
NEUTROPHILS # BLD AUTO: 9.79 K/UL — HIGH (ref 1.4–6.5)
NEUTROPHILS # BLD AUTO: 9.79 K/UL — HIGH (ref 1.4–6.5)
NEUTROPHILS NFR BLD AUTO: 82.1 % — HIGH (ref 42.2–75.2)
NEUTROPHILS NFR BLD AUTO: 82.1 % — HIGH (ref 42.2–75.2)
NRBC # BLD: 0 /100 WBCS — SIGNIFICANT CHANGE UP (ref 0–0)
NRBC # BLD: 0 /100 WBCS — SIGNIFICANT CHANGE UP (ref 0–0)
PHOSPHATE SERPL-MCNC: 3.3 MG/DL — SIGNIFICANT CHANGE UP (ref 2.1–4.9)
PHOSPHATE SERPL-MCNC: 3.3 MG/DL — SIGNIFICANT CHANGE UP (ref 2.1–4.9)
PLATELET # BLD AUTO: 380 K/UL — SIGNIFICANT CHANGE UP (ref 130–400)
PLATELET # BLD AUTO: 380 K/UL — SIGNIFICANT CHANGE UP (ref 130–400)
PMV BLD: 10.2 FL — SIGNIFICANT CHANGE UP (ref 7.4–10.4)
PMV BLD: 10.2 FL — SIGNIFICANT CHANGE UP (ref 7.4–10.4)
POTASSIUM SERPL-MCNC: 4.2 MMOL/L — SIGNIFICANT CHANGE UP (ref 3.5–5)
POTASSIUM SERPL-MCNC: 4.2 MMOL/L — SIGNIFICANT CHANGE UP (ref 3.5–5)
POTASSIUM SERPL-SCNC: 4.2 MMOL/L — SIGNIFICANT CHANGE UP (ref 3.5–5)
POTASSIUM SERPL-SCNC: 4.2 MMOL/L — SIGNIFICANT CHANGE UP (ref 3.5–5)
RBC # BLD: 3.92 M/UL — LOW (ref 4.7–6.1)
RBC # BLD: 3.92 M/UL — LOW (ref 4.7–6.1)
RBC # FLD: 12.8 % — SIGNIFICANT CHANGE UP (ref 11.5–14.5)
RBC # FLD: 12.8 % — SIGNIFICANT CHANGE UP (ref 11.5–14.5)
SODIUM SERPL-SCNC: 143 MMOL/L — SIGNIFICANT CHANGE UP (ref 135–146)
SODIUM SERPL-SCNC: 143 MMOL/L — SIGNIFICANT CHANGE UP (ref 135–146)
SPECIMEN SOURCE: SIGNIFICANT CHANGE UP
WBC # BLD: 11.91 K/UL — HIGH (ref 4.8–10.8)
WBC # BLD: 11.91 K/UL — HIGH (ref 4.8–10.8)
WBC # FLD AUTO: 11.91 K/UL — HIGH (ref 4.8–10.8)
WBC # FLD AUTO: 11.91 K/UL — HIGH (ref 4.8–10.8)

## 2023-12-23 PROCEDURE — 99232 SBSQ HOSP IP/OBS MODERATE 35: CPT

## 2023-12-23 RX ORDER — HYDROCORTISONE 1 %
1 OINTMENT (GRAM) TOPICAL
Refills: 0 | Status: DISCONTINUED | OUTPATIENT
Start: 2023-12-23 | End: 2023-12-24

## 2023-12-23 RX ORDER — DIPHENHYDRAMINE HCL 50 MG
50 CAPSULE ORAL EVERY 6 HOURS
Refills: 0 | Status: DISCONTINUED | OUTPATIENT
Start: 2023-12-23 | End: 2023-12-24

## 2023-12-23 RX ADMIN — PANTOPRAZOLE SODIUM 40 MILLIGRAM(S): 20 TABLET, DELAYED RELEASE ORAL at 06:50

## 2023-12-23 RX ADMIN — APIXABAN 5 MILLIGRAM(S): 2.5 TABLET, FILM COATED ORAL at 17:35

## 2023-12-23 RX ADMIN — AMPICILLIN SODIUM AND SULBACTAM SODIUM 200 GRAM(S): 250; 125 INJECTION, POWDER, FOR SUSPENSION INTRAMUSCULAR; INTRAVENOUS at 17:36

## 2023-12-23 RX ADMIN — Medication 1 APPLICATION(S): at 18:49

## 2023-12-23 RX ADMIN — AMPICILLIN SODIUM AND SULBACTAM SODIUM 200 GRAM(S): 250; 125 INJECTION, POWDER, FOR SUSPENSION INTRAMUSCULAR; INTRAVENOUS at 06:51

## 2023-12-23 RX ADMIN — Medication 5 MILLIGRAM(S): at 22:04

## 2023-12-23 RX ADMIN — Medication 1 APPLICATION(S): at 11:17

## 2023-12-23 RX ADMIN — Medication 500 MILLIGRAM(S): at 12:25

## 2023-12-23 RX ADMIN — MORPHINE SULFATE 4 MILLIGRAM(S): 50 CAPSULE, EXTENDED RELEASE ORAL at 11:17

## 2023-12-23 RX ADMIN — ZINC SULFATE TAB 220 MG (50 MG ZINC EQUIVALENT) 220 MILLIGRAM(S): 220 (50 ZN) TAB at 12:25

## 2023-12-23 RX ADMIN — Medication 1 TABLET(S): at 12:25

## 2023-12-23 RX ADMIN — Medication 5 MILLIGRAM(S): at 06:09

## 2023-12-23 RX ADMIN — AMPICILLIN SODIUM AND SULBACTAM SODIUM 200 GRAM(S): 250; 125 INJECTION, POWDER, FOR SUSPENSION INTRAMUSCULAR; INTRAVENOUS at 23:03

## 2023-12-23 RX ADMIN — Medication 40 MILLIGRAM(S): at 06:09

## 2023-12-23 RX ADMIN — Medication 250 MILLIGRAM(S): at 06:10

## 2023-12-23 RX ADMIN — Medication 25 MILLIGRAM(S): at 06:10

## 2023-12-23 RX ADMIN — APIXABAN 5 MILLIGRAM(S): 2.5 TABLET, FILM COATED ORAL at 06:09

## 2023-12-23 RX ADMIN — Medication 1 APPLICATION(S): at 22:07

## 2023-12-23 RX ADMIN — TAMSULOSIN HYDROCHLORIDE 0.4 MILLIGRAM(S): 0.4 CAPSULE ORAL at 22:04

## 2023-12-23 RX ADMIN — TRAMADOL HYDROCHLORIDE 50 MILLIGRAM(S): 50 TABLET ORAL at 00:50

## 2023-12-23 RX ADMIN — TRAMADOL HYDROCHLORIDE 50 MILLIGRAM(S): 50 TABLET ORAL at 00:14

## 2023-12-23 RX ADMIN — MORPHINE SULFATE 4 MILLIGRAM(S): 50 CAPSULE, EXTENDED RELEASE ORAL at 12:26

## 2023-12-23 RX ADMIN — Medication 250 MILLIGRAM(S): at 17:36

## 2023-12-23 RX ADMIN — CHLORHEXIDINE GLUCONATE 1 APPLICATION(S): 213 SOLUTION TOPICAL at 11:22

## 2023-12-23 RX ADMIN — AMPICILLIN SODIUM AND SULBACTAM SODIUM 200 GRAM(S): 250; 125 INJECTION, POWDER, FOR SUSPENSION INTRAMUSCULAR; INTRAVENOUS at 12:25

## 2023-12-23 RX ADMIN — ATORVASTATIN CALCIUM 40 MILLIGRAM(S): 80 TABLET, FILM COATED ORAL at 22:04

## 2023-12-23 RX ADMIN — TRAMADOL HYDROCHLORIDE 50 MILLIGRAM(S): 50 TABLET ORAL at 23:37

## 2023-12-23 NOTE — DISCHARGE NOTE PROVIDER - NSFOLLOWUPCLINICS_GEN_ALL_ED_FT
[facility]  Burn  [address], [address]  [place] [address]  Phone: [phone]  Fax:      [facility]  Burn  [address], [address]  [place] [address]  Phone: [phone]  Fax:

## 2023-12-23 NOTE — DISCHARGE NOTE PROVIDER - CARE PROVIDERS DIRECT ADDRESSES
[email or web address]

## 2023-12-23 NOTE — DISCHARGE NOTE PROVIDER - HOSPITAL COURSE
Patient is a 83-year-old male with PMHx, HTN, HLD, CAD s/p stents, s/p CABG, A-fib on Eliquis, pre-DM, BPH, OA,  presents to the ED for infected traumatic left leg wound that happened a few weeks ago.  Patient reports he bumped his leg on a chair and had a skin tear which she saw his PMD and he was also seen again in the ED 12/8 for similar symptoms and given a dose of IV antibiotics.  Patient states he has also been following up with his PMD and given Augmentin for 10 days which he completed and currently on clindamycin.  Patient reports that he has seen improvement so he followed up with Dr. Nayak and was told to come to the ED for IV antibiotics and possible debridement.  Patient reports increased pain, redness and swelling of affected area. Patient  denies fever, chills, abdominal pain, nausea, vomiting, diarrhea, SOB, or CP.      Pt was admitted to BURN service for further management.  Hospital course as follow:     #Infected traumatic LLE wound:  - CT LLE 12/8:  Focal 5.7 cm hyperdense fluid collection of the anterolateral LLE, most compatible with a hematoma.  Diffuse circumferential subcutaneous edema and fat stranding of LLE.Superficial skin ulceration of the anterolateral left lower extremity. No evidence of osseous erosions.  - LLE Venous duplex 12/19: no DVT   - MRSA PCR 12/20: NG  - WCx 12/19 from clinic: prelim rare Gram Positive Rods,  Moderate Gram Positive Cocci   - Wcx 12/20: NG; 12/22 x2: pending  - BCx 12/19 x 2: NG  - 12/22/23 s/p debridement of LLE   - while inpatient pt completed course of Unasyn 3g q6 hours   - as per ID recommendations on discharge  switch Unasyn to PO Augmentin 875/125 mg BID to continue until 10/29  - Continue Wound care twice a day - wash with soap and water. Apply gentamicin ointment, cover with xeroform, and wrap with Kerlix and ACE wrap   - Pain control as needed   - Activity - ambulate as tolerated    # Cards: Hx of HTN, HLD, Afib, CAD - s/p CABG   -Continue home medications Eliquis 5mg BID, Lipitor 40mg HS, Zetia 10mg  QD, Metroprolol 25mg QD, Lasix 40mg QD  -Monitor VS  -DASH/TLC diet     #Ortho  -Hx of OA  -Continue home medication Prednisone 5mg QD, Tramadol 50mg PRN    #  -Hx of BPH  Continue home medications Flomax 0.4mg HS    Pt is stable to be discharge home with VNS to continue wound care, and follow up in BURN Clinic in one week after discharge for further management.      HPI: Patient is an 83-year-old male with PMHx, HTN, HLD, CAD s/p stents, s/p CABG, A-fib on Eliquis, pre-DM, BPH, OA,  presents to the ED for infected traumatic left leg wound that happened a few weeks ago.  Patient reports he bumped his leg on a chair and had a skin tear which she saw his PMD and he was also seen again in the ED 12/8 for similar symptoms and given a dose of IV antibiotics.  Patient states he has also been following up with his PMD and given Augmentin for 10 days which he completed and currently on clindamycin.  Patient reports that he has seen improvement so he followed up with Dr. Nayak and was told to come to the ED for IV antibiotics and possible debridement.  Patient reports increased pain, redness and swelling of affected area. Patient  denies fever, chills, abdominal pain, nausea, vomiting, diarrhea, SOB, or CP.      Pt was admitted to BURN service for further management.  Hospital course as follow:     #Infected traumatic LLE wound:  - CT LLE 12/8:  Focal 5.7 cm hyperdense fluid collection of the anterolateral LLE, most compatible with a hematoma.  Diffuse circumferential subcutaneous edema and fat stranding of LLE.Superficial skin ulceration of the anterolateral left lower extremity. No evidence of osseous erosions.  - LLE Venous duplex 12/19: no DVT   - MRSA PCR 12/20: NG  - WCx 12/19 from clinic:  Numerous Staphylococcus epidermidis  , Few Candida parapsilosis  - Wcx 12/20:  Few Candida parapsilosis; 12/22 x2: NGTD  - BCx 12/19 x 2: NG  - 12/22/23 s/p debridement of LLE   - while inpatient pt completed course of Unasyn 3g q6 hours   - as per ID recommendations on discharge  switch Unasyn to PO Augmentin 875/125 mg BID to continue until 10/29  - Continue Wound care twice a day - wash with soap and water. Apply gentamicin ointment, cover with xeroform, and wrap with Kerlix and ACE wrap   - Pain control as needed   - Activity - ambulate as tolerated    # Cards: Hx of HTN, HLD, Afib, CAD - s/p CABG   -Continue home medications Eliquis 5mg BID, Lipitor 40mg HS, Zetia 10mg  QD, Metroprolol 25mg QD, Lasix 40mg QD  -Monitor VS  -DASH/TLC diet     #Ortho  -Hx of OA  -Continue home medication Prednisone 5mg QD, Tramadol 50mg PRN    #  -Hx of BPH  Continue home medications Flomax 0.4mg HS    Pt is stable to be discharge home with VNS to continue wound care, and follow up in BURN Clinic in one week after discharge for further management.

## 2023-12-23 NOTE — DISCHARGE NOTE PROVIDER - CARE PROVIDER_API CALL
Todd Nayak  Plastic Surgery  [address] [place] [address]  Phone: [phone]  Fax: [phone]  Follow Up Time:    Todd Nayak  Plastic Surgery  [address] [place] [address]  Phone: [phone]  Fax: [phone]  Follow Up Time:

## 2023-12-23 NOTE — DISCHARGE NOTE PROVIDER - NSDCFUADDAPPT_GEN_ALL_CORE_FT
Call [phone] to make a burn clinic appointment within 1 to 2 weeks of discharge.  Call [phone] to make a burn clinic appointment within 1 to 2 weeks of discharge.

## 2023-12-23 NOTE — DISCHARGE NOTE PROVIDER - NSDCFUSCHEDAPPT_GEN_ALL_CORE_FT
Todd Nayak  [facility] PreAdmits  Scheduled Appointment: 01/02/2024    [facility] Physician [facility]  BURNTREAT [address]  Scheduled Appointment: 01/02/2024    Wes Martinez  [facility] Physician [facility]  PULMMED [address]  Scheduled Appointment: 02/13/2024     Todd Nayak  [facility] PreAdmits  Scheduled Appointment: 01/02/2024    [facility] Physician [facility]  BURNTREAT [address]  Scheduled Appointment: 01/02/2024    Wes Martinez  [facility] Physician [facility]  PULMMED [address]  Scheduled Appointment: 02/13/2024

## 2023-12-23 NOTE — PROGRESS NOTE ADULT - ASSESSMENT
Patient is a 83-year-old male with PMH, OA, prediabetes,  A-fib on Eliquis, HTN, HLD, BPH , PSH of CABG w/ stents presents to the ED for infected left leg wound that happened a few weeks ago.    #Infected traumatic LLE wound   - POD 1 s/p Solitario LLE  - IV antibiotics per ID- continue unasyn 3g q 6 hours check MRSA nares to see if colonized (ordered)  -Venous duplex BLE 12/19: No evidence of deep venous thrombosis in either lower extremity. Limited visualization of the bilateral calf veins.   - Wound care twice a day - wash with soap and water. Gentamycin/Xeroform/Kerlex/ACE Wrap BID  - Pain control   - PT consult  - Activity - ambulate as tolerated    # Cards  -Hx of HTN, HLD, Afib, CABG w/ stents, LE edema  -Continue home medications Eliquis 5mg BID, Lipitor 40mg HS, Zetia 10mg (not available inpatient) QD, Metroprolol 25mg QD, LAsix 40mg QD  -Monitor VS  -DASH/TLC diet     #Ortho  -Hx of OA  -Continue home medication prednisone 5mg QD, Tramadol 50mg PRN    #  -Hx of BPH  Continue home medications Flomax 0.4mg HS    #Misc  - GI ppx - pantoprazole 40mg daily  - DVT ppx - Eliquis  & RLE compression sequentials   - Multivitamin, Vit C & Zinc for wound healing

## 2023-12-23 NOTE — DISCHARGE NOTE PROVIDER - NSDCMRMEDTOKEN_GEN_ALL_CORE_FT
Eliquis 5 mg oral tablet: 1 tab(s) orally 2 times a day  Flomax 0.4 mg oral capsule: 1 cap(s) orally once a day  furosemide 40 mg oral tablet: 1 tab(s) orally once a day  Lipitor 40 mg oral tablet: 1 tab(s) orally once a day (at bedtime)  metoprolol succinate 25 mg oral tablet, extended release: 1 tab(s) orally once a day  predniSONE 5 mg oral tablet: 1 tab(s) orally once a day  traMADol 50 mg oral tablet: 1 tab(s) orally once a day as needed for  severe pain  Zetia 10 mg oral tablet: 1 tab(s) orally once a day (at bedtime)   amoxicillin-clavulanate 875 mg-125 mg oral tablet: 875 milligram(s) orally every 12 hours  Eliquis 5 mg oral tablet: 1 tab(s) orally 2 times a day  Flomax 0.4 mg oral capsule: 1 cap(s) orally once a day  furosemide 40 mg oral tablet: 1 tab(s) orally once a day  gentamicin 0.1% topical ointment: Apply topically to affected area 2 times a day  Lipitor 40 mg oral tablet: 1 tab(s) orally once a day (at bedtime)  metoprolol succinate 25 mg oral tablet, extended release: 1 tab(s) orally once a day  predniSONE 5 mg oral tablet: 1 tab(s) orally once a day  traMADol 50 mg oral tablet: 1 tab(s) orally once a day as needed for  severe pain  Zetia 10 mg oral tablet: 1 tab(s) orally once a day (at bedtime)

## 2023-12-23 NOTE — DISCHARGE NOTE PROVIDER - NSDCCPCAREPLAN_GEN_ALL_CORE_FT
PRINCIPAL DISCHARGE DIAGNOSIS  Diagnosis: Traumatic open wound of left lower leg  Assessment and Plan of Treatment:      PRINCIPAL DISCHARGE DIAGNOSIS  Diagnosis: Traumatic open wound of left lower leg  Assessment and Plan of Treatment: Patient admitted for left lower extremity non healing wound.   - 12/22/2023 underwent debridement of Left lower extremity  - while inpatient pt completed course of Unasyn   - as per ID recommendations on discharge  contineu Augmentin 875/125 mg BID until 10/29  - Continue wound care twice a day - wash with soap and water. Apply gentamicin ointment, cover with xeroform, and wrap with Kerlix and ACE wrap   - Pain mediation as needed   - Activity - ambulate as tolerated

## 2023-12-24 VITALS
TEMPERATURE: 98 F | RESPIRATION RATE: 18 BRPM | SYSTOLIC BLOOD PRESSURE: 109 MMHG | DIASTOLIC BLOOD PRESSURE: 57 MMHG | OXYGEN SATURATION: 96 % | HEART RATE: 72 BPM

## 2023-12-24 LAB
ANION GAP SERPL CALC-SCNC: 8 MMOL/L — SIGNIFICANT CHANGE UP (ref 7–14)
ANION GAP SERPL CALC-SCNC: 8 MMOL/L — SIGNIFICANT CHANGE UP (ref 7–14)
BASOPHILS # BLD AUTO: 0.02 K/UL — SIGNIFICANT CHANGE UP (ref 0–0.2)
BASOPHILS # BLD AUTO: 0.02 K/UL — SIGNIFICANT CHANGE UP (ref 0–0.2)
BASOPHILS NFR BLD AUTO: 0.2 % — SIGNIFICANT CHANGE UP (ref 0–1)
BASOPHILS NFR BLD AUTO: 0.2 % — SIGNIFICANT CHANGE UP (ref 0–1)
BUN SERPL-MCNC: 17 MG/DL — SIGNIFICANT CHANGE UP (ref 10–20)
BUN SERPL-MCNC: 17 MG/DL — SIGNIFICANT CHANGE UP (ref 10–20)
CALCIUM SERPL-MCNC: 8.7 MG/DL — SIGNIFICANT CHANGE UP (ref 8.4–10.5)
CALCIUM SERPL-MCNC: 8.7 MG/DL — SIGNIFICANT CHANGE UP (ref 8.4–10.5)
CHLORIDE SERPL-SCNC: 99 MMOL/L — SIGNIFICANT CHANGE UP (ref 98–110)
CHLORIDE SERPL-SCNC: 99 MMOL/L — SIGNIFICANT CHANGE UP (ref 98–110)
CO2 SERPL-SCNC: 31 MMOL/L — SIGNIFICANT CHANGE UP (ref 17–32)
CO2 SERPL-SCNC: 31 MMOL/L — SIGNIFICANT CHANGE UP (ref 17–32)
CREAT SERPL-MCNC: 0.9 MG/DL — SIGNIFICANT CHANGE UP (ref 0.7–1.5)
CREAT SERPL-MCNC: 0.9 MG/DL — SIGNIFICANT CHANGE UP (ref 0.7–1.5)
CULTURE RESULTS: NO GROWTH — SIGNIFICANT CHANGE UP
CULTURE RESULTS: NO GROWTH — SIGNIFICANT CHANGE UP
CULTURE RESULTS: SIGNIFICANT CHANGE UP
EGFR: 86 ML/MIN/1.73M2 — SIGNIFICANT CHANGE UP
EGFR: 86 ML/MIN/1.73M2 — SIGNIFICANT CHANGE UP
EOSINOPHIL # BLD AUTO: 0.09 K/UL — SIGNIFICANT CHANGE UP (ref 0–0.7)
EOSINOPHIL # BLD AUTO: 0.09 K/UL — SIGNIFICANT CHANGE UP (ref 0–0.7)
EOSINOPHIL NFR BLD AUTO: 0.9 % — SIGNIFICANT CHANGE UP (ref 0–8)
EOSINOPHIL NFR BLD AUTO: 0.9 % — SIGNIFICANT CHANGE UP (ref 0–8)
GLUCOSE SERPL-MCNC: 132 MG/DL — HIGH (ref 70–99)
GLUCOSE SERPL-MCNC: 132 MG/DL — HIGH (ref 70–99)
HCT VFR BLD CALC: 36.6 % — LOW (ref 42–52)
HCT VFR BLD CALC: 36.6 % — LOW (ref 42–52)
HGB BLD-MCNC: 11.9 G/DL — LOW (ref 14–18)
HGB BLD-MCNC: 11.9 G/DL — LOW (ref 14–18)
IMM GRANULOCYTES NFR BLD AUTO: 0.5 % — HIGH (ref 0.1–0.3)
IMM GRANULOCYTES NFR BLD AUTO: 0.5 % — HIGH (ref 0.1–0.3)
LYMPHOCYTES # BLD AUTO: 0.94 K/UL — LOW (ref 1.2–3.4)
LYMPHOCYTES # BLD AUTO: 0.94 K/UL — LOW (ref 1.2–3.4)
LYMPHOCYTES # BLD AUTO: 9.6 % — LOW (ref 20.5–51.1)
LYMPHOCYTES # BLD AUTO: 9.6 % — LOW (ref 20.5–51.1)
MAGNESIUM SERPL-MCNC: 2.3 MG/DL — SIGNIFICANT CHANGE UP (ref 1.8–2.4)
MAGNESIUM SERPL-MCNC: 2.3 MG/DL — SIGNIFICANT CHANGE UP (ref 1.8–2.4)
MCHC RBC-ENTMCNC: 32.4 PG — HIGH (ref 27–31)
MCHC RBC-ENTMCNC: 32.4 PG — HIGH (ref 27–31)
MCHC RBC-ENTMCNC: 32.5 G/DL — SIGNIFICANT CHANGE UP (ref 32–37)
MCHC RBC-ENTMCNC: 32.5 G/DL — SIGNIFICANT CHANGE UP (ref 32–37)
MCV RBC AUTO: 99.7 FL — HIGH (ref 80–94)
MCV RBC AUTO: 99.7 FL — HIGH (ref 80–94)
MONOCYTES # BLD AUTO: 0.89 K/UL — HIGH (ref 0.1–0.6)
MONOCYTES # BLD AUTO: 0.89 K/UL — HIGH (ref 0.1–0.6)
MONOCYTES NFR BLD AUTO: 9.1 % — SIGNIFICANT CHANGE UP (ref 1.7–9.3)
MONOCYTES NFR BLD AUTO: 9.1 % — SIGNIFICANT CHANGE UP (ref 1.7–9.3)
NEUTROPHILS # BLD AUTO: 7.8 K/UL — HIGH (ref 1.4–6.5)
NEUTROPHILS # BLD AUTO: 7.8 K/UL — HIGH (ref 1.4–6.5)
NEUTROPHILS NFR BLD AUTO: 79.7 % — HIGH (ref 42.2–75.2)
NEUTROPHILS NFR BLD AUTO: 79.7 % — HIGH (ref 42.2–75.2)
NRBC # BLD: 0 /100 WBCS — SIGNIFICANT CHANGE UP (ref 0–0)
NRBC # BLD: 0 /100 WBCS — SIGNIFICANT CHANGE UP (ref 0–0)
PHOSPHATE SERPL-MCNC: 2.9 MG/DL — SIGNIFICANT CHANGE UP (ref 2.1–4.9)
PHOSPHATE SERPL-MCNC: 2.9 MG/DL — SIGNIFICANT CHANGE UP (ref 2.1–4.9)
PLATELET # BLD AUTO: 320 K/UL — SIGNIFICANT CHANGE UP (ref 130–400)
PLATELET # BLD AUTO: 320 K/UL — SIGNIFICANT CHANGE UP (ref 130–400)
PMV BLD: 10.3 FL — SIGNIFICANT CHANGE UP (ref 7.4–10.4)
PMV BLD: 10.3 FL — SIGNIFICANT CHANGE UP (ref 7.4–10.4)
POTASSIUM SERPL-MCNC: 4.1 MMOL/L — SIGNIFICANT CHANGE UP (ref 3.5–5)
POTASSIUM SERPL-MCNC: 4.1 MMOL/L — SIGNIFICANT CHANGE UP (ref 3.5–5)
POTASSIUM SERPL-SCNC: 4.1 MMOL/L — SIGNIFICANT CHANGE UP (ref 3.5–5)
POTASSIUM SERPL-SCNC: 4.1 MMOL/L — SIGNIFICANT CHANGE UP (ref 3.5–5)
RBC # BLD: 3.67 M/UL — LOW (ref 4.7–6.1)
RBC # BLD: 3.67 M/UL — LOW (ref 4.7–6.1)
RBC # FLD: 12.7 % — SIGNIFICANT CHANGE UP (ref 11.5–14.5)
RBC # FLD: 12.7 % — SIGNIFICANT CHANGE UP (ref 11.5–14.5)
SODIUM SERPL-SCNC: 138 MMOL/L — SIGNIFICANT CHANGE UP (ref 135–146)
SODIUM SERPL-SCNC: 138 MMOL/L — SIGNIFICANT CHANGE UP (ref 135–146)
SPECIMEN SOURCE: SIGNIFICANT CHANGE UP
WBC # BLD: 9.79 K/UL — SIGNIFICANT CHANGE UP (ref 4.8–10.8)
WBC # BLD: 9.79 K/UL — SIGNIFICANT CHANGE UP (ref 4.8–10.8)
WBC # FLD AUTO: 9.79 K/UL — SIGNIFICANT CHANGE UP (ref 4.8–10.8)
WBC # FLD AUTO: 9.79 K/UL — SIGNIFICANT CHANGE UP (ref 4.8–10.8)

## 2023-12-24 PROCEDURE — 99238 HOSP IP/OBS DSCHRG MGMT 30/<: CPT

## 2023-12-24 RX ORDER — GENTAMICIN SULFATE 0.1 %
1 OINTMENT (GRAM) TOPICAL
Qty: 1 | Refills: 2
Start: 2023-12-24 | End: 2024-02-03

## 2023-12-24 RX ADMIN — ZINC SULFATE TAB 220 MG (50 MG ZINC EQUIVALENT) 220 MILLIGRAM(S): 220 (50 ZN) TAB at 11:20

## 2023-12-24 RX ADMIN — Medication 5 MILLIGRAM(S): at 05:36

## 2023-12-24 RX ADMIN — TRAMADOL HYDROCHLORIDE 50 MILLIGRAM(S): 50 TABLET ORAL at 00:07

## 2023-12-24 RX ADMIN — AMPICILLIN SODIUM AND SULBACTAM SODIUM 200 GRAM(S): 250; 125 INJECTION, POWDER, FOR SUSPENSION INTRAMUSCULAR; INTRAVENOUS at 11:19

## 2023-12-24 RX ADMIN — Medication 1 APPLICATION(S): at 08:12

## 2023-12-24 RX ADMIN — Medication 1 TABLET(S): at 11:20

## 2023-12-24 RX ADMIN — Medication 1 APPLICATION(S): at 05:42

## 2023-12-24 RX ADMIN — Medication 250 MILLIGRAM(S): at 05:36

## 2023-12-24 RX ADMIN — Medication 25 MILLIGRAM(S): at 05:37

## 2023-12-24 RX ADMIN — PANTOPRAZOLE SODIUM 40 MILLIGRAM(S): 20 TABLET, DELAYED RELEASE ORAL at 05:37

## 2023-12-24 RX ADMIN — AMPICILLIN SODIUM AND SULBACTAM SODIUM 200 GRAM(S): 250; 125 INJECTION, POWDER, FOR SUSPENSION INTRAMUSCULAR; INTRAVENOUS at 05:36

## 2023-12-24 RX ADMIN — Medication 50 MILLIGRAM(S): at 04:13

## 2023-12-24 RX ADMIN — Medication 500 MILLIGRAM(S): at 11:20

## 2023-12-24 RX ADMIN — APIXABAN 5 MILLIGRAM(S): 2.5 TABLET, FILM COATED ORAL at 05:36

## 2023-12-24 RX ADMIN — Medication 40 MILLIGRAM(S): at 05:37

## 2023-12-24 NOTE — PROGRESS NOTE ADULT - TIME BILLING
wound eval and treat
I have personally seen and examined this patient.    I have reviewed all pertinent clinical information and reviewed all relevant imaging and diagnostic studies personally.   I counseled the patient about diagnostic testing and treatment plan. All questions were answered.   I discussed recommendations with the primary team.
wound care  antibx  d/c home

## 2023-12-24 NOTE — PROGRESS NOTE ADULT - ASSESSMENT
Patient is a 83-year-old male with PMH, OA, prediabetes,  A-fib on Eliquis, HTN, HLD, BPH , PSH of CABG w/ stents presents to the ED for infected left leg wound that happened a few weeks ago.    #Infected traumatic LLE wound   - POD 2 s/p Solitario LLE  - IV antibiotics per ID- continue unasyn 3g q 6 hours - MRSA nares neg  - DC on Augmentin until 12/29  -Venous duplex BLE 12/19: No evidence of deep venous thrombosis in either lower extremity. Limited visualization of the bilateral calf veins.   - Wound care twice a day - wash with soap and water. Gentamycin/Xeroform/Kerlex/ACE Wrap BID  - Pain control   - PT consult  - Activity - ambulate as tolerated  - DC today w home care    # Cards  -Hx of HTN, HLD, Afib, CABG w/ stents, LE edema  -Continue home medications Eliquis 5mg BID, Lipitor 40mg HS, Zetia 10mg (not available inpatient) QD, Metroprolol 25mg QD, LAsix 40mg QD  -Monitor VS  -DASH/TLC diet     #Ortho  -Hx of OA  -Continue home medication prednisone 5mg QD, Tramadol 50mg PRN    #  -Hx of BPH  Continue home medications Flomax 0.4mg HS    #Misc  - GI ppx - pantoprazole 40mg daily  - DVT ppx - Eliquis  & RLE compression sequentials   - Multivitamin, Vit C & Zinc for wound healing

## 2023-12-27 LAB
CULTURE RESULTS: SIGNIFICANT CHANGE UP
CULTURE RESULTS: SIGNIFICANT CHANGE UP
SPECIMEN SOURCE: SIGNIFICANT CHANGE UP
SPECIMEN SOURCE: SIGNIFICANT CHANGE UP

## 2023-12-28 LAB
SURGICAL PATHOLOGY STUDY: SIGNIFICANT CHANGE UP
SURGICAL PATHOLOGY STUDY: SIGNIFICANT CHANGE UP

## 2023-12-29 RX ORDER — AMOXICILLIN AND CLAVULANATE POTASSIUM 875; 125 MG/1; MG/1
875-125 TABLET, COATED ORAL
Qty: 8 | Refills: 0 | Status: ACTIVE | COMMUNITY
Start: 2023-12-29 | End: 1900-01-01

## 2024-01-02 ENCOUNTER — LABORATORY RESULT (OUTPATIENT)
Age: 84
End: 2024-01-02

## 2024-01-02 ENCOUNTER — APPOINTMENT (OUTPATIENT)
Dept: BURN CARE | Facility: CLINIC | Age: 84
End: 2024-01-02
Payer: MEDICARE

## 2024-01-02 ENCOUNTER — OUTPATIENT (OUTPATIENT)
Dept: OUTPATIENT SERVICES | Facility: HOSPITAL | Age: 84
LOS: 1 days | End: 2024-01-02
Payer: MEDICARE

## 2024-01-02 VITALS — SYSTOLIC BLOOD PRESSURE: 128 MMHG | TEMPERATURE: 97 F | DIASTOLIC BLOOD PRESSURE: 80 MMHG | HEART RATE: 81 BPM

## 2024-01-02 DIAGNOSIS — Z00.8 ENCOUNTER FOR OTHER GENERAL EXAMINATION: ICD-10-CM

## 2024-01-02 PROBLEM — E78.5 HYPERLIPIDEMIA, UNSPECIFIED: Chronic | Status: ACTIVE | Noted: 2023-12-19

## 2024-01-02 PROBLEM — M19.90 UNSPECIFIED OSTEOARTHRITIS, UNSPECIFIED SITE: Chronic | Status: ACTIVE | Noted: 2023-12-19

## 2024-01-02 PROBLEM — N40.0 BENIGN PROSTATIC HYPERPLASIA WITHOUT LOWER URINARY TRACT SYMPTOMS: Chronic | Status: ACTIVE | Noted: 2023-12-19

## 2024-01-02 PROBLEM — I10 ESSENTIAL (PRIMARY) HYPERTENSION: Chronic | Status: ACTIVE | Noted: 2023-12-19

## 2024-01-02 PROBLEM — I48.20 CHRONIC ATRIAL FIBRILLATION, UNSPECIFIED: Chronic | Status: ACTIVE | Noted: 2023-12-19

## 2024-01-02 PROCEDURE — 87075 CULTR BACTERIA EXCEPT BLOOD: CPT

## 2024-01-02 PROCEDURE — 11042 DBRDMT SUBQ TIS 1ST 20SQCM/<: CPT

## 2024-01-02 PROCEDURE — 11045 DBRDMT SUBQ TISS EACH ADDL: CPT

## 2024-01-02 PROCEDURE — 87070 CULTURE OTHR SPECIMN AEROBIC: CPT

## 2024-01-02 NOTE — HISTORY OF PRESENT ILLNESS
[Did you have an operation on your burn/wound injury?] : Did you have an operation on your burn/wound injury? No [Did this injury occur on the job?] : Did this injury occur on the job? No [de-identified] : home  [de-identified] : infected hematoma left lower leg on elaquis [de-identified] : left leg post debridement

## 2024-01-02 NOTE — REASON FOR VISIT
[Revisit] : revisit [Were you seen in the Emergency Room?] : seen in the emergency room [Were you admitted to the burn center at [facility]?] : not admitted to the burn center at [facility] [Spouse] : spouse [Family Member] : family member

## 2024-01-02 NOTE — PHYSICAL EXAM
[Healing] : healing [Size%: ______] : Size: [unfilled]% [Infected?] : Infected: Yes [3] : 3 out of 10 [Abnormal] : abnormal [Large] : medium [] : yes [de-identified] : Juanell [de-identified] : The left lower leg measures 5x5cm and  is healing post debridement .  Adherent adherent devitalized tissue Excisional debridement with scissors was performed on devitalized tissue down  to and including subcutaneous tissue. left leg.  A wound culture was obtained. The patient was instructed to clean  the wound with soap and water. Follow up 1 week.  Continue Continue local wound care.   [TWNoteComboBox1] : False

## 2024-01-02 NOTE — ASSESSMENT
[FreeTextEntry1] : The left lower leg measures 5x5cm and  is healing post debridement .  Adherent adherent devitalized tissue Excisional debridement with scissors was performed on devitalized tissue down  to and including subcutaneous tissue. left leg.  A wound culture was obtained. The patient was instructed to clean  the wound with soap and water. Follow up 1 week.  Continue Continue local wound care.   [Wound Care] : wound care

## 2024-01-03 DIAGNOSIS — S80.12XD CONTUSION OF LEFT LOWER LEG, SUBSEQUENT ENCOUNTER: ICD-10-CM

## 2024-01-03 DIAGNOSIS — Z98.890 OTHER SPECIFIED POSTPROCEDURAL STATES: ICD-10-CM

## 2024-01-03 DIAGNOSIS — L08.89 OTHER SPECIFIED LOCAL INFECTIONS OF THE SKIN AND SUBCUTANEOUS TISSUE: ICD-10-CM

## 2024-01-09 ENCOUNTER — LABORATORY RESULT (OUTPATIENT)
Age: 84
End: 2024-01-09

## 2024-01-09 ENCOUNTER — OUTPATIENT (OUTPATIENT)
Dept: OUTPATIENT SERVICES | Facility: HOSPITAL | Age: 84
LOS: 1 days | End: 2024-01-09
Payer: MEDICARE

## 2024-01-09 ENCOUNTER — APPOINTMENT (OUTPATIENT)
Dept: BURN CARE | Facility: CLINIC | Age: 84
End: 2024-01-09
Payer: MEDICARE

## 2024-01-09 VITALS — SYSTOLIC BLOOD PRESSURE: 132 MMHG | TEMPERATURE: 98.2 F | HEART RATE: 83 BPM | DIASTOLIC BLOOD PRESSURE: 64 MMHG

## 2024-01-09 DIAGNOSIS — Z00.8 ENCOUNTER FOR OTHER GENERAL EXAMINATION: ICD-10-CM

## 2024-01-09 PROCEDURE — 11042 DBRDMT SUBQ TIS 1ST 20SQCM/<: CPT

## 2024-01-09 PROCEDURE — 11045 DBRDMT SUBQ TISS EACH ADDL: CPT

## 2024-01-09 PROCEDURE — 87077 CULTURE AEROBIC IDENTIFY: CPT

## 2024-01-09 PROCEDURE — 87070 CULTURE OTHR SPECIMN AEROBIC: CPT

## 2024-01-09 PROCEDURE — 87186 SC STD MICRODIL/AGAR DIL: CPT

## 2024-01-09 PROCEDURE — 87075 CULTR BACTERIA EXCEPT BLOOD: CPT

## 2024-01-09 RX ORDER — HYDROCORTISONE 25 MG/G
2.5 OINTMENT TOPICAL TWICE DAILY
Qty: 1 | Refills: 1 | Status: ACTIVE | COMMUNITY
Start: 2024-01-09 | End: 1900-01-01

## 2024-01-10 DIAGNOSIS — L08.89 OTHER SPECIFIED LOCAL INFECTIONS OF THE SKIN AND SUBCUTANEOUS TISSUE: ICD-10-CM

## 2024-01-10 DIAGNOSIS — S80.12XD CONTUSION OF LEFT LOWER LEG, SUBSEQUENT ENCOUNTER: ICD-10-CM

## 2024-01-10 DIAGNOSIS — Z98.890 OTHER SPECIFIED POSTPROCEDURAL STATES: ICD-10-CM

## 2024-01-16 ENCOUNTER — OUTPATIENT (OUTPATIENT)
Dept: OUTPATIENT SERVICES | Facility: HOSPITAL | Age: 84
LOS: 1 days | End: 2024-01-16
Payer: MEDICARE

## 2024-01-16 ENCOUNTER — APPOINTMENT (OUTPATIENT)
Dept: BURN CARE | Facility: CLINIC | Age: 84
End: 2024-01-16
Payer: MEDICARE

## 2024-01-16 VITALS — TEMPERATURE: 98.8 F

## 2024-01-16 DIAGNOSIS — Z00.8 ENCOUNTER FOR OTHER GENERAL EXAMINATION: ICD-10-CM

## 2024-01-16 DIAGNOSIS — L08.89 OTHER SPECIFIED LOCAL INFECTIONS OF THE SKIN AND SUBCUTANEOUS TISSUE: ICD-10-CM

## 2024-01-16 DIAGNOSIS — S80.12XD CONTUSION OF LEFT LOWER LEG, SUBSEQUENT ENCOUNTER: ICD-10-CM

## 2024-01-16 DIAGNOSIS — Z98.890 OTHER SPECIFIED POSTPROCEDURAL STATES: ICD-10-CM

## 2024-01-16 PROCEDURE — 87186 SC STD MICRODIL/AGAR DIL: CPT

## 2024-01-16 PROCEDURE — 87070 CULTURE OTHR SPECIMN AEROBIC: CPT

## 2024-01-16 PROCEDURE — 99213 OFFICE O/P EST LOW 20 MIN: CPT

## 2024-01-16 PROCEDURE — 87077 CULTURE AEROBIC IDENTIFY: CPT

## 2024-01-16 RX ORDER — CIPROFLOXACIN HYDROCHLORIDE 500 MG/1
500 TABLET, FILM COATED ORAL
Qty: 20 | Refills: 0 | Status: ACTIVE | COMMUNITY
Start: 2024-01-16 | End: 1900-01-01

## 2024-01-16 NOTE — HISTORY OF PRESENT ILLNESS
[Did you have an operation on your burn/wound injury?] : Did you have an operation on your burn/wound injury? No [Did this injury occur on the job?] : Did this injury occur on the job? No [de-identified] : home  [de-identified] : infected hematoma left lower leg on elaquis [de-identified] : left leg post debridement

## 2024-01-16 NOTE — REASON FOR VISIT
[Revisit] : revisit [Were you seen in the Emergency Room?] : seen in the emergency room [Were you admitted to the burn center at [facility]?] : not admitted to the burn center at [facility] [Spouse] : spouse [Family Member] : family member

## 2024-01-16 NOTE — ASSESSMENT
[FreeTextEntry1] : The left lower leg measures 5x5cm and  is healing post debridement .  Cultures are positive.  A wound culture was obtained. The patient was instructed to clean  the wound with soap and water. Continue local wound care. Follow up 1 week.   May need IV antibiotics.   [Wound Care] : wound care

## 2024-01-16 NOTE — PHYSICAL EXAM
[Healing] : healing [Size%: ______] : Size: [unfilled]% [Infected?] : Infected: Yes [3] : 3 out of 10 [Abnormal] : abnormal [Large] : medium [] : yes [de-identified] : Juanell [de-identified] : The left lower leg measures 5x5cm and  is healing post debridement .  Cultures are positive.  A wound culture was obtained. The patient was instructed to clean  the wound with soap and water. Continue local wound care. Follow up 1 week.   May need IV antibiotics.

## 2024-01-19 LAB — BACTERIA SPEC CULT: ABNORMAL

## 2024-01-22 ENCOUNTER — NON-APPOINTMENT (OUTPATIENT)
Age: 84
End: 2024-01-22

## 2024-01-23 ENCOUNTER — LABORATORY RESULT (OUTPATIENT)
Age: 84
End: 2024-01-23

## 2024-01-23 ENCOUNTER — OUTPATIENT (OUTPATIENT)
Dept: OUTPATIENT SERVICES | Facility: HOSPITAL | Age: 84
LOS: 1 days | End: 2024-01-23
Payer: MEDICARE

## 2024-01-23 ENCOUNTER — APPOINTMENT (OUTPATIENT)
Dept: BURN CARE | Facility: CLINIC | Age: 84
End: 2024-01-23
Payer: MEDICARE

## 2024-01-23 VITALS — DIASTOLIC BLOOD PRESSURE: 73 MMHG | SYSTOLIC BLOOD PRESSURE: 129 MMHG | TEMPERATURE: 97.6 F | HEART RATE: 91 BPM

## 2024-01-23 DIAGNOSIS — Z00.8 ENCOUNTER FOR OTHER GENERAL EXAMINATION: ICD-10-CM

## 2024-01-23 PROCEDURE — 11045 DBRDMT SUBQ TISS EACH ADDL: CPT

## 2024-01-23 PROCEDURE — 11042 DBRDMT SUBQ TIS 1ST 20SQCM/<: CPT

## 2024-01-23 PROCEDURE — 87186 SC STD MICRODIL/AGAR DIL: CPT

## 2024-01-23 PROCEDURE — 87075 CULTR BACTERIA EXCEPT BLOOD: CPT

## 2024-01-23 PROCEDURE — 87077 CULTURE AEROBIC IDENTIFY: CPT

## 2024-01-23 PROCEDURE — 87070 CULTURE OTHR SPECIMN AEROBIC: CPT

## 2024-01-23 RX ORDER — DOXYCYCLINE HYCLATE 100 MG/1
100 CAPSULE ORAL
Qty: 14 | Refills: 0 | Status: ACTIVE | COMMUNITY
Start: 2024-01-23 | End: 1900-01-01

## 2024-01-23 NOTE — PHYSICAL EXAM
[Healing] : healing [Size%: ______] : Size: [unfilled]% [Infected?] : Infected: Yes [3] : 3 out of 10 [Abnormal] : abnormal [Large] : medium [] : yes [de-identified] : Juanell [de-identified] : The left lower leg measures 5x5cm and  is healing post debridement .  Adherent adherent devitalized tissue Excisional debridement with scissors was performed on devitalized tissue down  to and including subcutaneous tissue.   Cultures are positive.  A wound culture was obtained. The patient was instructed to clean  the wound with soap and water. Continue local wound care.  Follow up 2 - 4  weeks.

## 2024-01-23 NOTE — HISTORY OF PRESENT ILLNESS
[Did you have an operation on your burn/wound injury?] : Did you have an operation on your burn/wound injury? No [de-identified] : home  [Did this injury occur on the job?] : Did this injury occur on the job? No [de-identified] : infected hematoma left lower leg on elaquis [de-identified] : left leg post debridement

## 2024-01-23 NOTE — REASON FOR VISIT
[Were you seen in the Emergency Room?] : seen in the emergency room [Revisit] : revisit [Were you admitted to the burn center at [facility]?] : not admitted to the burn center at [facility] [Spouse] : spouse [Family Member] : family member Yes

## 2024-01-23 NOTE — ASSESSMENT
[FreeTextEntry1] : The left lower leg measures 5x5cm and  is healing post debridement .  Adherent adherent devitalized tissue Excisional debridement with scissors was performed on devitalized tissue down  to and including subcutaneous tissue.   Cultures are positive.  A wound culture was obtained. The patient was instructed to clean  the wound with soap and water. Continue local wound care.  Follow up 2 - 4  weeks.  [Wound Care] : wound care

## 2024-01-24 DIAGNOSIS — S80.12XD CONTUSION OF LEFT LOWER LEG, SUBSEQUENT ENCOUNTER: ICD-10-CM

## 2024-01-24 DIAGNOSIS — Z98.890 OTHER SPECIFIED POSTPROCEDURAL STATES: ICD-10-CM

## 2024-01-24 DIAGNOSIS — L08.89 OTHER SPECIFIED LOCAL INFECTIONS OF THE SKIN AND SUBCUTANEOUS TISSUE: ICD-10-CM

## 2024-02-06 ENCOUNTER — OUTPATIENT (OUTPATIENT)
Dept: OUTPATIENT SERVICES | Facility: HOSPITAL | Age: 84
LOS: 1 days | End: 2024-02-06
Payer: MEDICARE

## 2024-02-06 ENCOUNTER — LABORATORY RESULT (OUTPATIENT)
Age: 84
End: 2024-02-06

## 2024-02-06 ENCOUNTER — APPOINTMENT (OUTPATIENT)
Dept: BURN CARE | Facility: CLINIC | Age: 84
End: 2024-02-06
Payer: MEDICARE

## 2024-02-06 VITALS — DIASTOLIC BLOOD PRESSURE: 86 MMHG | HEART RATE: 80 BPM | TEMPERATURE: 96.9 F | SYSTOLIC BLOOD PRESSURE: 129 MMHG

## 2024-02-06 DIAGNOSIS — Z00.8 ENCOUNTER FOR OTHER GENERAL EXAMINATION: ICD-10-CM

## 2024-02-06 PROCEDURE — 99212 OFFICE O/P EST SF 10 MIN: CPT

## 2024-02-06 PROCEDURE — 87186 SC STD MICRODIL/AGAR DIL: CPT

## 2024-02-06 PROCEDURE — 87077 CULTURE AEROBIC IDENTIFY: CPT

## 2024-02-06 PROCEDURE — 87070 CULTURE OTHR SPECIMN AEROBIC: CPT

## 2024-02-06 NOTE — ASSESSMENT
[FreeTextEntry1] : The left lower leg measures 5x5cm and  is healing with new granulating tissue.  There is no tenderness.   The patient was instructed to clean  the wound with soap and water. Continue local wound care. with NS wet to dry dressing change.  A wound culture was obtained. Follow up 2 - 4  weeks.  [Wound Care] : wound care

## 2024-02-06 NOTE — REASON FOR VISIT
[Revisit] : revisit [Were you seen in the Emergency Room?] : seen in the emergency room [Were you admitted to the burn center at [facility]?] : not admitted to the burn center at [facility] [Spouse] : spouse [Family Member] : family member

## 2024-02-06 NOTE — PHYSICAL EXAM
[Healing] : healing [Size%: ______] : Size: [unfilled]% [Infected?] : Infected: Yes [3] : 3 out of 10 [Abnormal] : abnormal [Large] : medium [] : no [de-identified] : The left lower leg measures 5x5cm and  is healing with new granulating tissue.  There is no tenderness.   The patient was instructed to clean  the wound with soap and water. Continue local wound care. with NS wet to dry dressing change.  A wound culture was obtained. Follow up 2 - 4  weeks.  [TWNoteComboBox1] : wet to dry

## 2024-02-06 NOTE — HISTORY OF PRESENT ILLNESS
[Did you have an operation on your burn/wound injury?] : Did you have an operation on your burn/wound injury? No [Did this injury occur on the job?] : Did this injury occur on the job? No [de-identified] : home  [de-identified] : infected hematoma left lower leg on elaquis [de-identified] : left leg post debridement healing

## 2024-02-07 DIAGNOSIS — L08.89 OTHER SPECIFIED LOCAL INFECTIONS OF THE SKIN AND SUBCUTANEOUS TISSUE: ICD-10-CM

## 2024-02-07 DIAGNOSIS — Z98.890 OTHER SPECIFIED POSTPROCEDURAL STATES: ICD-10-CM

## 2024-02-07 DIAGNOSIS — S80.12XD CONTUSION OF LEFT LOWER LEG, SUBSEQUENT ENCOUNTER: ICD-10-CM

## 2024-02-13 ENCOUNTER — APPOINTMENT (OUTPATIENT)
Dept: BURN CARE | Facility: CLINIC | Age: 84
End: 2024-02-13

## 2024-02-20 ENCOUNTER — OUTPATIENT (OUTPATIENT)
Dept: OUTPATIENT SERVICES | Facility: HOSPITAL | Age: 84
LOS: 1 days | End: 2024-02-20
Payer: MEDICARE

## 2024-02-20 ENCOUNTER — APPOINTMENT (OUTPATIENT)
Dept: BURN CARE | Facility: CLINIC | Age: 84
End: 2024-02-20
Payer: MEDICARE

## 2024-02-20 VITALS — HEART RATE: 77 BPM | SYSTOLIC BLOOD PRESSURE: 135 MMHG | TEMPERATURE: 97.6 F | DIASTOLIC BLOOD PRESSURE: 89 MMHG

## 2024-02-20 DIAGNOSIS — Z00.8 ENCOUNTER FOR OTHER GENERAL EXAMINATION: ICD-10-CM

## 2024-02-20 PROCEDURE — 99212 OFFICE O/P EST SF 10 MIN: CPT

## 2024-02-20 NOTE — HISTORY OF PRESENT ILLNESS
[Did you have an operation on your burn/wound injury?] : Did you have an operation on your burn/wound injury? No [Did this injury occur on the job?] : Did this injury occur on the job? No [de-identified] : home  [de-identified] : infected hematoma left lower leg on elaquis [de-identified] : left leg post debridement healing

## 2024-02-20 NOTE — PHYSICAL EXAM
[Healing] : healing [Size%: ______] : Size: [unfilled]% [Infected?] : Infected: Yes [3] : 3 out of 10 [Abnormal] : abnormal [Large] : medium [] : no [de-identified] : The left lower leg measures 5x5cm and  is healing with new granulating tissue.  There is no tenderness.   The patient was instructed to clean  the wound with soap and water. Continue local wound care. with NS wet to dry dressing change. . Follow up 2 - 4  weeks.  [TWNoteComboBox1] : wet to dry

## 2024-02-20 NOTE — REASON FOR VISIT
[Revisit] : revisit [Were you seen in the Emergency Room?] : seen in the emergency room [Were you admitted to the burn center at [facility]?] : not admitted to the burn center at [facility] [Spouse] : spouse [Family Member] : family member

## 2024-02-20 NOTE — ASSESSMENT
[FreeTextEntry1] : The left lower leg measures 5x5cm and  is healing with new granulating tissue.  There is no tenderness.   The patient was instructed to clean  the wound with soap and water. Continue local wound care. with NS wet to dry dressing change. . Follow up 2 - 4  weeks.  [Wound Care] : wound care

## 2024-02-21 DIAGNOSIS — S80.12XD CONTUSION OF LEFT LOWER LEG, SUBSEQUENT ENCOUNTER: ICD-10-CM

## 2024-02-21 DIAGNOSIS — Z98.890 OTHER SPECIFIED POSTPROCEDURAL STATES: ICD-10-CM

## 2024-02-21 DIAGNOSIS — L08.89 OTHER SPECIFIED LOCAL INFECTIONS OF THE SKIN AND SUBCUTANEOUS TISSUE: ICD-10-CM

## 2024-03-12 ENCOUNTER — APPOINTMENT (OUTPATIENT)
Dept: BURN CARE | Facility: CLINIC | Age: 84
End: 2024-03-12
Payer: MEDICARE

## 2024-03-12 ENCOUNTER — LABORATORY RESULT (OUTPATIENT)
Age: 84
End: 2024-03-12

## 2024-03-12 ENCOUNTER — OUTPATIENT (OUTPATIENT)
Dept: OUTPATIENT SERVICES | Facility: HOSPITAL | Age: 84
LOS: 1 days | End: 2024-03-12
Payer: MEDICARE

## 2024-03-12 VITALS — HEART RATE: 79 BPM | SYSTOLIC BLOOD PRESSURE: 154 MMHG | DIASTOLIC BLOOD PRESSURE: 86 MMHG | TEMPERATURE: 98.6 F

## 2024-03-12 DIAGNOSIS — Z00.8 ENCOUNTER FOR OTHER GENERAL EXAMINATION: ICD-10-CM

## 2024-03-12 PROCEDURE — 97597 DBRDMT OPN WND 1ST 20 CM/<: CPT

## 2024-03-12 PROCEDURE — 87070 CULTURE OTHR SPECIMN AEROBIC: CPT

## 2024-03-12 PROCEDURE — 97598 DBRDMT OPN WND ADDL 20CM/<: CPT

## 2024-03-12 NOTE — HISTORY OF PRESENT ILLNESS
[Did you have an operation on your burn/wound injury?] : Did you have an operation on your burn/wound injury? No [Did this injury occur on the job?] : Did this injury occur on the job? No [de-identified] : home  [de-identified] : left leg post debridement healing   new blisters left upper lower leg  [de-identified] : infected hematoma left lower leg on elaquis

## 2024-03-12 NOTE — PHYSICAL EXAM
[Healing] : healing [Size%: ______] : Size: [unfilled]% [Infected?] : Infected: Yes [Abnormal] : abnormal [3] : 3 out of 10 [Large] : medium [] : no [de-identified] : ayakaell [TWNoteComboBox1] : False [de-identified] : The left lower leg measures 5x5cm and  is healing with new granulating tissue.  There is no tenderness.   The patient was instructed to clean  the wound with soap and water. Continue local wound care. ayakaell.     New wound prox left lower leg --> 5x5cm .. Excisional debridement with scissors was performed on  devitalized tissue down  to and including  dermis.  .  .Xeroform gauze   . . Follow up 2 - 4  weeks.

## 2024-03-12 NOTE — REASON FOR VISIT
[Revisit] : revisit [Were you seen in the Emergency Room?] : seen in the emergency room [Were you admitted to the burn center at [facility]?] : not admitted to the burn center at [facility] [Spouse] : spouse [Family Member] : family member

## 2024-03-12 NOTE — ASSESSMENT
[FreeTextEntry1] : The left lower leg measures 5x5cm and  is healing with new granulating tissue.  There is no tenderness.   The patient was instructed to clean  the wound with soap and water. Continue local wound care. ayakaell.     New wound prox left lower leg --> 5x5cm .. Excisional debridement with scissors was performed on  devitalized tissue down  to and including  dermis.  .  .Xeroform gauze   . . Follow up 2 - 4  weeks.  [Wound Care] : wound care

## 2024-03-13 DIAGNOSIS — L08.89 OTHER SPECIFIED LOCAL INFECTIONS OF THE SKIN AND SUBCUTANEOUS TISSUE: ICD-10-CM

## 2024-03-13 DIAGNOSIS — S70.321A BLISTER (NONTHERMAL), RIGHT THIGH, INITIAL ENCOUNTER: ICD-10-CM
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
## 2024-03-13 DIAGNOSIS — S80.12XD CONTUSION OF LEFT LOWER LEG, SUBSEQUENT ENCOUNTER: ICD-10-CM
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
## 2024-03-26 ENCOUNTER — APPOINTMENT (OUTPATIENT)
Dept: BURN CARE | Facility: CLINIC | Age: 84
End: 2024-03-26
Payer: MEDICARE

## 2024-03-26 ENCOUNTER — OUTPATIENT (OUTPATIENT)
Dept: OUTPATIENT SERVICES | Facility: HOSPITAL | Age: 84
LOS: 1 days | End: 2024-03-26
Payer: MEDICARE

## 2024-03-26 VITALS — SYSTOLIC BLOOD PRESSURE: 126 MMHG | HEART RATE: 79 BPM | TEMPERATURE: 98 F | DIASTOLIC BLOOD PRESSURE: 51 MMHG

## 2024-03-26 DIAGNOSIS — Z00.8 ENCOUNTER FOR OTHER GENERAL EXAMINATION: ICD-10-CM

## 2024-03-26 PROCEDURE — 11045 DBRDMT SUBQ TISS EACH ADDL: CPT

## 2024-03-26 PROCEDURE — 11042 DBRDMT SUBQ TIS 1ST 20SQCM/<: CPT

## 2024-03-27 ENCOUNTER — APPOINTMENT (OUTPATIENT)
Dept: PULMONOLOGY | Facility: CLINIC | Age: 84
End: 2024-03-27
Payer: MEDICARE

## 2024-03-27 VITALS
SYSTOLIC BLOOD PRESSURE: 110 MMHG | HEART RATE: 54 BPM | DIASTOLIC BLOOD PRESSURE: 70 MMHG | BODY MASS INDEX: 38.5 KG/M2 | HEIGHT: 71 IN | OXYGEN SATURATION: 97 % | WEIGHT: 275 LBS

## 2024-03-27 DIAGNOSIS — R91.1 SOLITARY PULMONARY NODULE: ICD-10-CM

## 2024-03-27 DIAGNOSIS — G47.33 OBSTRUCTIVE SLEEP APNEA (ADULT) (PEDIATRIC): ICD-10-CM

## 2024-03-27 PROCEDURE — 99214 OFFICE O/P EST MOD 30 MIN: CPT

## 2024-03-27 PROCEDURE — G2211 COMPLEX E/M VISIT ADD ON: CPT

## 2024-03-27 NOTE — ASSESSMENT
[FreeTextEntry1] : The left lower leg measures 5x5cm and  is healing with areas of necrotic tissue.    There is no tenderness.   The patient was instructed to clean  the wound with soap and water. Continue local wound care. ayakaell.      Excisional debridement with scissors was performed on  devitalized tissue down  to and including  subcutaneous tissue.  .  .Xeroform gauze   . . Follow up 2 - 4  weeks.  [Wound Care] : wound care

## 2024-03-27 NOTE — PLAN
[TextEntry] : patient was counseled to use the machine every night at least 4 hrs  also counseled for weight loss and exercise ct scan ordered if stable no need to repeat ct scan will confirm at least 2 years stability

## 2024-03-27 NOTE — PHYSICAL EXAM
[Healing] : healing [Size%: ______] : Size: [unfilled]% [Infected?] : Infected: Yes [3] : 3 out of 10 [Abnormal] : abnormal [Large] : medium [] : no [de-identified] : ayakaell [de-identified] : The left lower leg measures 5x5cm and  is healing with areas of necrotic tissue.    There is no tenderness.   The patient was instructed to clean  the wound with soap and water. Continue local wound care. ayakaell.      Excisional debridement with scissors was performed on  devitalized tissue down  to and including  subcutaneous tissue.  .  .Xeroform gauze   . . Follow up 2 - 4  weeks.

## 2024-03-27 NOTE — HISTORY OF PRESENT ILLNESS
[Did you have an operation on your burn/wound injury?] : Did you have an operation on your burn/wound injury? No [Did this injury occur on the job?] : Did this injury occur on the job? No [de-identified] : infected hematoma left lower leg on elaquis [de-identified] : home  [de-identified] : left leg post debridement healing

## 2024-03-27 NOTE — REASON FOR VISIT
[Revisit] : revisit [Were you seen in the Emergency Room?] : seen in the emergency room [Were you admitted to the burn center at [facility]?] : not admitted to the burn center at [facility] [Family Member] : family member [Spouse] : spouse

## 2024-03-27 NOTE — HISTORY OF PRESENT ILLNESS
[TextBox_4] : Patient coming for follow-up said been using the CPAP machine every night and getting benefit denies any cough wheezing shortness of breath no daytime somnolence Patient quit smoking more than 15 years ago CT scan from December 2022 reviewed stable 4 mm nodule told patient we will repeat 1 more CAT scan if stable no need to follow-up

## 2024-03-27 NOTE — PHYSICAL EXAM
[No Acute Distress] : no acute distress [Normal Oropharynx] : normal oropharynx [Normal Appearance] : normal appearance [No Neck Mass] : no neck mass [Normal Rate/Rhythm] : normal rate/rhythm [Normal S1, S2] : normal s1, s2 [No Murmurs] : no murmurs [No Resp Distress] : no resp distress [Clear to Auscultation Bilaterally] : clear to auscultation bilaterally [No Abnormalities] : no abnormalities [Normal Gait] : normal gait [No Cyanosis] : no cyanosis [No Clubbing] : no clubbing [No Edema] : no edema [FROM] : FROM [No Focal Deficits] : no focal deficits [Oriented x3] : oriented x3 [Normal Affect] : normal affect

## 2024-03-28 DIAGNOSIS — S80.12XD CONTUSION OF LEFT LOWER LEG, SUBSEQUENT ENCOUNTER: ICD-10-CM

## 2024-03-28 DIAGNOSIS — L08.89 OTHER SPECIFIED LOCAL INFECTIONS OF THE SKIN AND SUBCUTANEOUS TISSUE: ICD-10-CM

## 2024-04-16 ENCOUNTER — OUTPATIENT (OUTPATIENT)
Dept: OUTPATIENT SERVICES | Facility: HOSPITAL | Age: 84
LOS: 1 days | End: 2024-04-16
Payer: MEDICARE

## 2024-04-16 ENCOUNTER — APPOINTMENT (OUTPATIENT)
Dept: BURN CARE | Facility: CLINIC | Age: 84
End: 2024-04-16
Payer: MEDICARE

## 2024-04-16 VITALS — TEMPERATURE: 97.6 F | SYSTOLIC BLOOD PRESSURE: 120 MMHG | DIASTOLIC BLOOD PRESSURE: 80 MMHG | HEART RATE: 68 BPM

## 2024-04-16 DIAGNOSIS — Z00.8 ENCOUNTER FOR OTHER GENERAL EXAMINATION: ICD-10-CM

## 2024-04-16 PROCEDURE — 11042 DBRDMT SUBQ TIS 1ST 20SQCM/<: CPT

## 2024-04-16 PROCEDURE — 11045 DBRDMT SUBQ TISS EACH ADDL: CPT

## 2024-04-17 NOTE — PHYSICAL EXAM
[Healing] : healing [Size%: ______] : Size: [unfilled]% [Infected?] : Infected: Yes [3] : 3 out of 10 [Abnormal] : abnormal [Large] : medium [] : no [de-identified] : yaakaell [de-identified] : The left lower leg measures 5x5cm and  is healing with areas of necrotic tissue.    There is no tenderness.   The patient was instructed to clean  the wound with soap and water. Continue local wound care. ayakaell.      Excisional debridement with scissors was performed on  devitalized tissue down  to and including  subcutaneous tissue.  .  .Xeroform gauze   . . Follow up 2 - 4  weeks.

## 2024-04-17 NOTE — HISTORY OF PRESENT ILLNESS
[Did you have an operation on your burn/wound injury?] : Did you have an operation on your burn/wound injury? No [Did this injury occur on the job?] : Did this injury occur on the job? No [de-identified] : home  [de-identified] : infected hematoma left lower leg on elaquis [de-identified] : left leg post debridement healing

## 2024-04-17 NOTE — REASON FOR VISIT
[Revisit] : revisit [Were you seen in the Emergency Room?] : seen in the emergency room [Were you admitted to the burn center at [facility]?] : not admitted to the burn center at [facility] [Spouse] : spouse [Family Member] : family member

## 2024-04-18 DIAGNOSIS — Z98.890 OTHER SPECIFIED POSTPROCEDURAL STATES: ICD-10-CM

## 2024-04-18 DIAGNOSIS — S80.12XD CONTUSION OF LEFT LOWER LEG, SUBSEQUENT ENCOUNTER: ICD-10-CM

## 2024-04-18 DIAGNOSIS — L08.89 OTHER SPECIFIED LOCAL INFECTIONS OF THE SKIN AND SUBCUTANEOUS TISSUE: ICD-10-CM

## 2024-04-18 DIAGNOSIS — I96 GANGRENE, NOT ELSEWHERE CLASSIFIED: ICD-10-CM

## 2024-05-07 ENCOUNTER — OUTPATIENT (OUTPATIENT)
Dept: OUTPATIENT SERVICES | Facility: HOSPITAL | Age: 84
LOS: 1 days | End: 2024-05-07
Payer: MEDICARE

## 2024-05-07 ENCOUNTER — APPOINTMENT (OUTPATIENT)
Dept: BURN CARE | Facility: CLINIC | Age: 84
End: 2024-05-07
Payer: MEDICARE

## 2024-05-07 VITALS — SYSTOLIC BLOOD PRESSURE: 121 MMHG | DIASTOLIC BLOOD PRESSURE: 64 MMHG | TEMPERATURE: 98.6 F | HEART RATE: 72 BPM

## 2024-05-07 DIAGNOSIS — Z86.79 PERSONAL HISTORY OF OTHER DISEASES OF THE CIRCULATORY SYSTEM: ICD-10-CM

## 2024-05-07 DIAGNOSIS — Z00.8 ENCOUNTER FOR OTHER GENERAL EXAMINATION: ICD-10-CM

## 2024-05-07 PROCEDURE — 99212 OFFICE O/P EST SF 10 MIN: CPT

## 2024-05-07 NOTE — ASSESSMENT
[FreeTextEntry1] : The left lower leg wound  measures 3x2cm and  is healing and  granulating .    There is no tenderness.   The patient was instructed to clean  the wound with soap and water. Continue local wound care. aquacell.     Ace wrap compression applied.  Follow up 2 - 4  weeks.     [Wound Care] : wound care

## 2024-05-07 NOTE — HISTORY OF PRESENT ILLNESS
[Did you have an operation on your burn/wound injury?] : Did you have an operation on your burn/wound injury? No [Did this injury occur on the job?] : Did this injury occur on the job? No [de-identified] : home  [de-identified] : infected hematoma left lower leg on elaquis [de-identified] : left leg post debridement healing

## 2024-05-07 NOTE — REASON FOR VISIT
[Revisit] : revisit [Were you seen in the Emergency Room?] : seen in the emergency room [Were you admitted to the burn center at [facility]?] : not admitted to the burn center at [facility] [Spouse] : spouse [Family Member] : family member

## 2024-05-07 NOTE — PHYSICAL EXAM
[Healing] : healing [Size%: ______] : Size: [unfilled]% [Infected?] : Infected: No [3] : 3 out of 10 [Abnormal] : abnormal [Medium] : medium [] : no [de-identified] : ayakaell [de-identified] : The left lower leg wound  measures 3x2cm and  is healing and  granulating .    There is no tenderness.   The patient was instructed to clean  the wound with soap and water. Continue local wound care. aquacell.     Ace wrap compression applied.  Follow up 2 - 4  weeks.

## 2024-05-09 DIAGNOSIS — I10 ESSENTIAL (PRIMARY) HYPERTENSION: ICD-10-CM

## 2024-05-09 DIAGNOSIS — I48.91 UNSPECIFIED ATRIAL FIBRILLATION: ICD-10-CM

## 2024-05-09 DIAGNOSIS — S80.12XD CONTUSION OF LEFT LOWER LEG, SUBSEQUENT ENCOUNTER: ICD-10-CM

## 2024-05-28 ENCOUNTER — APPOINTMENT (OUTPATIENT)
Dept: BURN CARE | Facility: CLINIC | Age: 84
End: 2024-05-28
Payer: MEDICARE

## 2024-05-28 ENCOUNTER — OUTPATIENT (OUTPATIENT)
Dept: OUTPATIENT SERVICES | Facility: HOSPITAL | Age: 84
LOS: 1 days | End: 2024-05-28
Payer: MEDICARE

## 2024-05-28 VITALS — DIASTOLIC BLOOD PRESSURE: 65 MMHG | HEART RATE: 70 BPM | SYSTOLIC BLOOD PRESSURE: 132 MMHG | TEMPERATURE: 97.1 F

## 2024-05-28 DIAGNOSIS — Z00.8 ENCOUNTER FOR OTHER GENERAL EXAMINATION: ICD-10-CM

## 2024-05-28 PROCEDURE — 99213 OFFICE O/P EST LOW 20 MIN: CPT

## 2024-05-30 DIAGNOSIS — L08.89 OTHER SPECIFIED LOCAL INFECTIONS OF THE SKIN AND SUBCUTANEOUS TISSUE: ICD-10-CM

## 2024-05-30 DIAGNOSIS — Z98.890 OTHER SPECIFIED POSTPROCEDURAL STATES: ICD-10-CM

## 2024-05-30 DIAGNOSIS — S80.12XD CONTUSION OF LEFT LOWER LEG, SUBSEQUENT ENCOUNTER: ICD-10-CM

## 2024-06-25 ENCOUNTER — OUTPATIENT (OUTPATIENT)
Dept: OUTPATIENT SERVICES | Facility: HOSPITAL | Age: 84
LOS: 1 days | End: 2024-06-25
Payer: MEDICARE

## 2024-06-25 ENCOUNTER — APPOINTMENT (OUTPATIENT)
Dept: BURN CARE | Facility: CLINIC | Age: 84
End: 2024-06-25

## 2024-06-25 VITALS — DIASTOLIC BLOOD PRESSURE: 52 MMHG | SYSTOLIC BLOOD PRESSURE: 125 MMHG | HEART RATE: 66 BPM

## 2024-06-25 DIAGNOSIS — S80.12XD CONTUSION OF LEFT LOWER LEG, SUBSEQUENT ENCOUNTER: ICD-10-CM

## 2024-06-25 DIAGNOSIS — Z00.8 ENCOUNTER FOR OTHER GENERAL EXAMINATION: ICD-10-CM

## 2024-06-25 DIAGNOSIS — Z98.890 OTHER SPECIFIED POSTPROCEDURAL STATES: ICD-10-CM

## 2024-06-25 DIAGNOSIS — R60.0 LOCALIZED EDEMA: ICD-10-CM

## 2024-06-25 DIAGNOSIS — Z79.01 LONG TERM (CURRENT) USE OF ANTICOAGULANTS: ICD-10-CM

## 2024-06-25 PROCEDURE — 99213 OFFICE O/P EST LOW 20 MIN: CPT

## 2024-06-25 NOTE — HISTORY OF PRESENT ILLNESS
[Did you have an operation on your burn/wound injury?] : Did you have an operation on your burn/wound injury? No [Did this injury occur on the job?] : Did this injury occur on the job? No [de-identified] : home  [de-identified] : infected hematoma left lower leg on elaquis [de-identified] : left leg post debridement healing

## 2024-06-25 NOTE — REASON FOR VISIT
[Revisit] : revisit [Were you seen in the Emergency Room?] : seen in the emergency room [Spouse] : spouse [Family Member] : family member [Were you admitted to the burn center at [facility]?] : not admitted to the burn center at [facility]

## 2024-06-25 NOTE — PHYSICAL EXAM
[Healing] : healing [Size%: ______] : Size: [unfilled]% [3] : 3 out of 10 [Abnormal] : abnormal [Medium] : medium [Infected?] : Infected: No [] : no [de-identified] : ayakaell [de-identified] :   Subjective:   - Summary: Mr. Hector Mcbride is an 84-year-old male presenting for a follow-up visit regarding a traumatic wound on his left lower leg sustained in December. He has a history of coronary artery disease, coronary artery bypass surgery, and hypertension.   - Chief Complaint (CC): Follow-up for left lower leg traumatic wound.   - History of Present Illness (HPI):     - Chief Complaint: Follow-up for left lower leg traumatic wound.     - Onset of Symptoms: Sustained a traumatic wound to the left lower leg in December.     - Characterization of the Discomfort: No specific discomfort mentioned.     - Duration: Wound sustained in December.     - Location: Left lower leg.     - Severity: Not specified.     - Aggravating Factors: Not mentioned.     - Relieving Factors: Not mentioned.     - Associated Signs and Symptoms: Not mentioned.     - Temporal Factors: Not mentioned.     - Context: Not mentioned.     - Associated Symptoms: Not mentioned.     - Severity and Impact: Not specified.     - Comparative Analysis: Not mentioned.     - Patient's Medical Regimen: Treated with AquaCell dressing status post debridement.     - Contextual Factors: Not mentioned.     - Recent Medical Interventions: Treated with AquaCell dressing status post debridement.   - Past Medical History:     - Coronary artery disease     - Coronary artery bypass surgery     - Hypertension   - Past Surgical History:     - Coronary artery bypass surgery   - Family History: Not mentioned.   - Social History: Not mentioned.   - Review of Systems: Not mentioned.   - Medications: Not mentioned.   - Allergies: Not mentioned.   Objective:   - Diagnostic Results: Not mentioned.   - Vital Signs: Not mentioned.   - Physical Examination (PE):     - Left Lower Leg Wound: Measures about 2 cm x 1 cm, granulating well, no infection, no tenderness.     - Bilateral Lower Extremity Edema: Present.   Assessment:   - Summary: Mr. Hector Mcbride is an 84-year-old male with a history of coronary artery disease, coronary artery bypass surgery, and hypertension, presenting for a follow-up visit regarding a traumatic wound on his left lower leg sustained in December. The wound is granulating well, with no signs of infection or tenderness. Bilateral lower extremity edema is present.   - Problems:     - Traumatic wound to the left lower leg     - Bilateral lower extremity edema   - Differential Diagnosis:   Plan:   - Summary: The plan is to continue wound care with AquaCell dressing and compression with an ACE wrap. Over-the-counter pain medications such as Tylenol or Ibuprofen can be taken as needed for pain management. A follow-up visit is scheduled in four weeks to monitor the wound healing progress.   - Plan:     - Clean the wound with soap and water     - Continue AquaCell dressing     - Apply an ACE wrap for compression     - Take over-the-counter pain medications (Tylenol, Ibuprofen) as needed for pain     - Follow-up in four weeks

## 2024-06-25 NOTE — ASSESSMENT
[Wound Care] : wound care [FreeTextEntry1] :  Subjective:   - Summary: Mr. Hector Mcbride is an 84-year-old male presenting for a follow-up visit regarding a traumatic wound on his left lower leg sustained in December. He has a history of coronary artery disease, coronary artery bypass surgery, and hypertension.   - Chief Complaint (CC): Follow-up for left lower leg traumatic wound.   - History of Present Illness (HPI):     - Chief Complaint: Follow-up for left lower leg traumatic wound.     - Onset of Symptoms: Sustained a traumatic wound to the left lower leg in December.     - Characterization of the Discomfort: No specific discomfort mentioned.     - Duration: Wound sustained in December.     - Location: Left lower leg.     - Severity: Not specified.     - Aggravating Factors: Not mentioned.     - Relieving Factors: Not mentioned.     - Associated Signs and Symptoms: Not mentioned.     - Temporal Factors: Not mentioned.     - Context: Not mentioned.     - Associated Symptoms: Not mentioned.     - Severity and Impact: Not specified.     - Comparative Analysis: Not mentioned.     - Patient's Medical Regimen: Treated with AquaCell dressing status post debridement.     - Contextual Factors: Not mentioned.     - Recent Medical Interventions: Treated with AquaCell dressing status post debridement.   - Past Medical History:     - Coronary artery disease     - Coronary artery bypass surgery     - Hypertension   - Past Surgical History:     - Coronary artery bypass surgery   - Family History: Not mentioned.   - Social History: Not mentioned.   - Review of Systems: Not mentioned.   - Medications: Not mentioned.   - Allergies: Not mentioned.   Objective:   - Diagnostic Results: Not mentioned.   - Vital Signs: Not mentioned.   - Physical Examination (PE):     - Left Lower Leg Wound: Measures about 2 cm x 1 cm, granulating well, no infection, no tenderness.     - Bilateral Lower Extremity Edema: Present.   Assessment:   - Summary: Mr. Hector Mcbride is an 84-year-old male with a history of coronary artery disease, coronary artery bypass surgery, and hypertension, presenting for a follow-up visit regarding a traumatic wound on his left lower leg sustained in December. The wound is granulating well, with no signs of infection or tenderness. Bilateral lower extremity edema is present.   - Problems:     - Traumatic wound to the left lower leg     - Bilateral lower extremity edema   - Differential Diagnosis:   Plan:   - Summary: The plan is to continue wound care with AquaCell dressing and compression with an ACE wrap. Over-the-counter pain medications such as Tylenol or Ibuprofen can be taken as needed for pain management. A follow-up visit is scheduled in four weeks to monitor the wound healing progress.   - Plan:     - Clean the wound with soap and water     - Continue AquaCell dressing     - Apply an ACE wrap for compression     - Take over-the-counter pain medications (Tylenol, Ibuprofen) as needed for pain     - Follow-up in four weeks

## 2024-08-06 ENCOUNTER — APPOINTMENT (OUTPATIENT)
Dept: BURN CARE | Facility: CLINIC | Age: 84
End: 2024-08-06

## 2024-08-06 ENCOUNTER — OUTPATIENT (OUTPATIENT)
Dept: OUTPATIENT SERVICES | Facility: HOSPITAL | Age: 84
LOS: 1 days | End: 2024-08-06
Payer: MEDICARE

## 2024-08-06 DIAGNOSIS — Z00.8 ENCOUNTER FOR OTHER GENERAL EXAMINATION: ICD-10-CM

## 2024-08-06 PROCEDURE — 99213 OFFICE O/P EST LOW 20 MIN: CPT

## 2024-08-16 PROBLEM — T14.8XXD DELAYED WOUND HEALING: Status: ACTIVE | Noted: 2024-08-16

## 2024-08-16 NOTE — HISTORY OF PRESENT ILLNESS
[de-identified] : December , 2023 [de-identified] : Patient developed wound from trauma to the left leg [de-identified] : Continuing Aquacel dressing and Ace compression wrap.  No specific concerns

## 2024-08-16 NOTE — PHYSICAL EXAM
[Healing] : healing [Size%: ______] : Size: [unfilled]% [Infected?] : Infected: No [0] : 0 out of 10 [Normal] : normal [Small] : small  [] : no [de-identified] : Hydrogel [de-identified] : Left leg- ~1 x 0.5 cm wound site- somewhat desiccated with scaly surrounding skin and thin overlying eschar-  No cellulitis significant drainage or evidence of infection [TWNoteComboBox1] : adaptic

## 2024-08-16 NOTE — ASSESSMENT
[FreeTextEntry1] : Slowly healing left leg wound.  Continuing wound care discussed with patient Recommend discontinue Aquacel-educated patient on the indication for Aquacel type dressing which is less applicable to his wound at this point as wound quite dry with no exudate/drainage Recommend begin Medihoney application-every other day with dressing, after washing Compression dressing to decrease edema; also discussed the use of compression socks in the long-term Questions and concerns addressed Follow-up in 3 weeks [Wound Care] : wound care

## 2024-08-21 DIAGNOSIS — S81.802D UNSPECIFIED OPEN WOUND, LEFT LOWER LEG, SUBSEQUENT ENCOUNTER: ICD-10-CM

## 2024-08-27 ENCOUNTER — OUTPATIENT (OUTPATIENT)
Dept: OUTPATIENT SERVICES | Facility: HOSPITAL | Age: 84
LOS: 1 days | End: 2024-08-27
Payer: MEDICARE

## 2024-08-27 ENCOUNTER — APPOINTMENT (OUTPATIENT)
Dept: BURN CARE | Facility: CLINIC | Age: 84
End: 2024-08-27
Payer: MEDICARE

## 2024-08-27 VITALS — SYSTOLIC BLOOD PRESSURE: 132 MMHG | DIASTOLIC BLOOD PRESSURE: 74 MMHG | HEART RATE: 73 BPM

## 2024-08-27 DIAGNOSIS — Z00.8 ENCOUNTER FOR OTHER GENERAL EXAMINATION: ICD-10-CM

## 2024-08-27 PROCEDURE — 99213 OFFICE O/P EST LOW 20 MIN: CPT

## 2024-08-27 NOTE — PHYSICAL EXAM
[de-identified] :  Subjective:   - Summary: Mr. Hector FRANCIS, an 84-year-old male patient, presented for a follow-up visit regarding a traumatic wound on his left lower leg and new scratches on his right lower leg.   - Chief Complaint (CC): Traumatic wound to the left lower leg with new scratches to the right lower leg.   - History of Present Illness: Hector FRANCIS, an 84-year-old male patient, presented for a follow-up visit regarding a traumatic wound on his left lower leg and new scratches on his right lower leg. The patient has a history of atrial fibrillation and is currently on anticoagulation therapy. During the examination, the left lower leg wound measured approximately 1 by 0.5 centimeters and appeared to be healing well. On the right lower leg, two new wound areas were observed, measuring approximately 0.2 by 0.2 centimeters each, with dried hemotissue present.   - Past Medical History: The patient has a history of atrial fibrillation and is currently on anticoagulation therapy.   - Past Surgical History:    - Family History:    - Social History:    - Review of Systems:    - General:    - Neurological:    - Musculoskeletal:    - Cardiovascular:    - Respiratory:    - Gastrointestinal:    - Genitourinary:    - Integumentary:    - Psychiatric:    - Medications:    - Allergies:    Objective:   - Diagnostic Results:    - Vital Signs:    - Physical Examination (PE): On examination, the left lower leg wound measured approximately 1 by 0.5 centimeters and appeared to be healing well. On the right lower leg, two new wound areas were observed, measuring approximately 0.2 by 0.2 centimeters each, with dried hemotissue present.   Assessment and Plan:   - Traumatic Wound on Left Lower Leg: The traumatic wound on the left lower leg is healing well.     - Therapeutic Interventions: Discontinue Medi Honey dressing on the left leg.      - Follow-Up: Follow up in two weeks for reassessment.    - New Scratches on Right Lower Leg: New scratches were observed on the right lower leg, with two wound areas measuring approximately 0.2 by 0.2 centimeters each, with dried hemotissue present.     - Therapeutic Interventions: Apply Band-Aid, vasotracin, and Band-Aid to the right leg wounds.      - Follow-Up: Follow up in two weeks for reassessment.
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
## 2024-08-28 DIAGNOSIS — S80.811A ABRASION, RIGHT LOWER LEG, INITIAL ENCOUNTER: ICD-10-CM

## 2024-08-28 DIAGNOSIS — S81.802A UNSPECIFIED OPEN WOUND, LEFT LOWER LEG, INITIAL ENCOUNTER: ICD-10-CM

## 2024-10-08 ENCOUNTER — OUTPATIENT (OUTPATIENT)
Dept: OUTPATIENT SERVICES | Facility: HOSPITAL | Age: 84
LOS: 1 days | Discharge: HOME | End: 2024-10-08
Payer: MEDICARE

## 2024-10-08 ENCOUNTER — APPOINTMENT (OUTPATIENT)
Dept: BURN CARE | Facility: CLINIC | Age: 84
End: 2024-10-08
Payer: MEDICARE

## 2024-10-08 VITALS
SYSTOLIC BLOOD PRESSURE: 134 MMHG | OXYGEN SATURATION: 97 % | WEIGHT: 263 LBS | HEART RATE: 76 BPM | DIASTOLIC BLOOD PRESSURE: 74 MMHG | BODY MASS INDEX: 36.68 KG/M2

## 2024-10-08 DIAGNOSIS — Z00.8 ENCOUNTER FOR OTHER GENERAL EXAMINATION: ICD-10-CM

## 2024-10-08 DIAGNOSIS — S71.109A UNSPECIFIED OPEN WOUND, UNSPECIFIED THIGH, INITIAL ENCOUNTER: ICD-10-CM

## 2024-10-08 PROCEDURE — 99213 OFFICE O/P EST LOW 20 MIN: CPT

## 2024-10-08 RX ORDER — NYSTATIN 100MM UNIT
POWDER (EA) MISCELLANEOUS
Qty: 2 | Refills: 2 | Status: ACTIVE | COMMUNITY
Start: 2024-10-08 | End: 1900-01-01

## 2024-10-15 DIAGNOSIS — E65 LOCALIZED ADIPOSITY: ICD-10-CM

## 2024-10-15 DIAGNOSIS — Z87.828 PERSONAL HISTORY OF OTHER (HEALED) PHYSICAL INJURY AND TRAUMA: ICD-10-CM

## 2024-10-15 DIAGNOSIS — R21 RASH AND OTHER NONSPECIFIC SKIN ERUPTION: ICD-10-CM

## 2024-12-02 ENCOUNTER — APPOINTMENT (OUTPATIENT)
Dept: PULMONOLOGY | Facility: CLINIC | Age: 84
End: 2024-12-02
Payer: MEDICARE

## 2024-12-02 ENCOUNTER — APPOINTMENT (OUTPATIENT)
Dept: PULMONOLOGY | Facility: CLINIC | Age: 84
End: 2024-12-02

## 2024-12-02 VITALS
WEIGHT: 260 LBS | OXYGEN SATURATION: 95 % | SYSTOLIC BLOOD PRESSURE: 102 MMHG | BODY MASS INDEX: 36.4 KG/M2 | RESPIRATION RATE: 14 BRPM | HEART RATE: 67 BPM | DIASTOLIC BLOOD PRESSURE: 60 MMHG | HEIGHT: 71 IN

## 2024-12-02 DIAGNOSIS — R91.1 SOLITARY PULMONARY NODULE: ICD-10-CM

## 2024-12-02 DIAGNOSIS — G47.33 OBSTRUCTIVE SLEEP APNEA (ADULT) (PEDIATRIC): ICD-10-CM

## 2024-12-02 PROCEDURE — G2211 COMPLEX E/M VISIT ADD ON: CPT

## 2024-12-02 PROCEDURE — 99213 OFFICE O/P EST LOW 20 MIN: CPT

## 2025-03-16 NOTE — PROGRESS NOTE ADULT - SUBJECTIVE AND OBJECTIVE BOX
[id number]   ORDERING CLINICIAN: RANJIT WILKS   
Patient is a 83y old  Male who presents with a chief complaint of LLE wound (22 Dec 2023 10:33)    INTERVAL HPI/OVERNIGHT EVENTS:  - NPO for OR today   - Afebrile, no acute events overnight     Vital Signs Last 24 Hrs  T(C): 36.3 (22 Dec 2023 08:12), Max: 37.1 (21 Dec 2023 23:35)  T(F): 97.3 (22 Dec 2023 08:12), Max: 98.8 (21 Dec 2023 23:35)  HR: 70 (22 Dec 2023 08:12) (70 - 90)  BP: 144/72 (22 Dec 2023 08:12) (113/62 - 144/72)  BP(mean): 95 (21 Dec 2023 23:35) (81 - 95)  RR: 18 (22 Dec 2023 08:12) (18 - 19)  SpO2: 97% (21 Dec 2023 23:35) (96% - 98%)    O2 Parameters below as of 21 Dec 2023 19:35  Patient On (Oxygen Delivery Method): room air    I&O's Summary  21 Dec 2023 07:01  -  22 Dec 2023 07:00  --------------------------------------------------------  IN: 500 mL / OUT: 1700 mL / NET: -1200 mL    22 Dec 2023 07:01  -  22 Dec 2023 12:49  --------------------------------------------------------  IN: 150 mL / OUT: 600 mL / NET: -450 mL    LABS:                  12.3   10.18 )-----------( 354      ( 22 Dec 2023 11:25 )             37.8     12-21    142  |  101  |  9<L>  ----------------------------<  123<H>  3.7   |  31  |  0.9    Ca    8.9      21 Dec 2023 12:03  Phos  2.4     12-21  Mg     2.3     12-21    CAPILLARY BLOOD GLUCOSE  POCT Blood Glucose.: 114 mg/dL (22 Dec 2023 12:03)  POCT Blood Glucose.: 123 mg/dL (22 Dec 2023 08:07)  POCT Blood Glucose.: 136 mg/dL (21 Dec 2023 17:19)    MEDICATIONS  (STANDING):  ampicillin/sulbactam  IVPB 3 Gram(s) IV Intermittent every 6 hours  ascorbic acid 500 milliGRAM(s) Oral daily  atorvastatin 40 milliGRAM(s) Oral at bedtime  bacitracin   Ointment 1 Application(s) Topical two times a day  chlorhexidine 4% Liquid 1 Application(s) Topical <User Schedule>  diphenhydrAMINE 25 milliGRAM(s) Oral once  furosemide    Tablet 40 milliGRAM(s) Oral daily  lactated ringers. 1000 milliLiter(s) (50 mL/Hr) IV Continuous <Continuous>  melatonin 5 milliGRAM(s) Oral at bedtime  metoprolol succinate ER 25 milliGRAM(s) Oral daily  multivitamin/minerals 1 Tablet(s) Oral daily  pantoprazole    Tablet 40 milliGRAM(s) Oral before breakfast  predniSONE   Tablet 5 milliGRAM(s) Oral daily  saccharomyces boulardii 250 milliGRAM(s) Oral two times a day  senna 2 Tablet(s) Oral at bedtime  tamsulosin 0.4 milliGRAM(s) Oral at bedtime  zinc sulfate 220 milliGRAM(s) Oral daily    MEDICATIONS  (PRN):  acetaminophen     Tablet .. 650 milliGRAM(s) Oral every 6 hours PRN Mild Pain (1 - 3)  morphine  - Injectable 2 milliGRAM(s) IV Push every 6 hours PRN Severe Pain (7 - 10)  morphine  - Injectable 4 milliGRAM(s) IV Push two times a day PRN wound care  polyethylene glycol 3350 17 Gram(s) Oral daily PRN Constipation  traMADol 50 milliGRAM(s) Oral every 8 hours PRN for severe pain    PHYSICAL EXAM:  GENERAL: NAD, lying in bed comfortably.   HEAD:  Atraumatic, Normocephalic  CHEST/LUNG: Breathing comfortably on room air, No increased work of breathing noted.   HEART: In no acute cardiopulmonary distress.   PSYCH: AAOx3  SKIN:   LLE: anterior aspect full thickness wound ~4cm x3cm, pink and moist base with surrounding erythema and swelling noted. No purulent drainage, malodor, or active bleeding evident.
[facility], [place], [place].   MACRO: None   Signed by: Marlene Christian 3/9/2025 12:48 PM Dictation workstation:   
[id number]    [id number]   ORDERING CLINICIAN: RANJIT WILKS   [id number]   ORDERING CLINICIAN: RANJIT WILKS   [id number]   Office ext [phone]      
Patient is a 83y old  Male who presents with a chief complaint of LLE wound (23 Dec 2023 14:44)    AM rounds     Pt: no complaints  No acute events o/n  DC planning today     Vital Signs Last 24 Hrs  T(C): 36.2 (24 Dec 2023 09:33), Max: 37.1 (24 Dec 2023 05:11)  T(F): 97.1 (24 Dec 2023 09:33), Max: 98.7 (24 Dec 2023 05:11)  HR: 67 (24 Dec 2023 09:33) (67 - 84)  BP: 122/59 (24 Dec 2023 09:33) (120/59 - 140/65)  RR: 18 (24 Dec 2023 09:33) (18 - 18)  SpO2: 97% (24 Dec 2023 09:33) (96% - 98%)        I&O's Summary    23 Dec 2023 07:01  -  24 Dec 2023 07:00  --------------------------------------------------------  IN: 100 mL / OUT: 600 mL / NET: -500 mL        12-23    143  |  101  |  13  ----------------------------<  123<H>  4.2   |  30  |  1.0    Ca    9.0      23 Dec 2023 12:06  Phos  3.3     12-23  Mg     2.3     12-23                            12.7   11.91 )-----------( 380      ( 23 Dec 2023 12:06 )             39.1     CAPILLARY BLOOD GLUCOSE      POCT Blood Glucose.: 134 mg/dL (23 Dec 2023 12:22)    Culture - Tissue with Gram Stain (12.22.23 @ 15:28)    Gram Stain:   Few polymorphonuclear leukocytes seen per low power field  No organisms seen per oil power field   Specimen Source: .Tissue left leg B   Culture Results:   No growth        EXAM:   GENERAL: NAD, lying in bed comfortably.   HEAD:  Atraumatic, Normocephalic  CHEST/LUNG: Breathing comfortably on room air, No increased work of breathing noted.   HEART: In no acute cardiopulmonary distress.   PSYCH: AAOx3  SKIN:   LLE: anterior aspect full thickness wound ~4cm x3cm, pink and moist base with minimal erythema and mild swelling noted. No purulent drainage, malodor, or active bleeding evident.        
Patient is a 83-year-old male with PMH, OA, prediabetes,  A-fib on Eliquis, HTN, HLD, BPH , PSH of CABG w/ stents presents to the ED for infected traumatic left leg wound.    AM Rounds   INTERVAL HISTORY:  No acute events overnight. Afebrile  Patient seen at bedside. Dressing change performed. Patient tolerated well.       Vital Signs Last 24 Hrs  T(C): 36.7 (20 Dec 2023 07:10), Max: 36.9 (20 Dec 2023 00:52)  T(F): 98.1 (20 Dec 2023 07:10), Max: 98.4 (20 Dec 2023 00:52)  HR: 75 (20 Dec 2023 07:10) (75 - 90)  BP: 116/55 (20 Dec 2023 07:10) (112/52 - 132/63)  BP(mean): 79 (20 Dec 2023 07:10) (79 - 79)  RR: 18 (20 Dec 2023 07:10) (16 - 18)  SpO2: 96% (20 Dec 2023 07:10) (94% - 96%)    Parameters below as of 20 Dec 2023 00:52  Patient On (Oxygen Delivery Method): room air      I&O's Detail    19 Dec 2023 07:01  -  20 Dec 2023 07:00  --------------------------------------------------------  IN:    IV PiggyBack: 200 mL    Lactated Ringers: 250 mL  Total IN: 450 mL    OUT:    Voided (mL): 100 mL  Total OUT: 100 mL    Total NET: 350 mL      20 Dec 2023 07:01  -  20 Dec 2023 11:43  --------------------------------------------------------  IN:  Total IN: 0 mL    OUT:    Voided (mL): 1550 mL  Total OUT: 1550 mL    Total NET: -1550 mL            MEDICATIONS  (STANDING):  ampicillin/sulbactam  IVPB 3 Gram(s) IV Intermittent every 6 hours  apixaban 5 milliGRAM(s) Oral every 12 hours  ascorbic acid 500 milliGRAM(s) Oral daily  atorvastatin 40 milliGRAM(s) Oral at bedtime  bacitracin   Ointment 1 Application(s) Topical two times a day  chlorhexidine 4% Liquid 1 Application(s) Topical <User Schedule>  furosemide    Tablet 40 milliGRAM(s) Oral daily  lactated ringers. 1000 milliLiter(s) (50 mL/Hr) IV Continuous <Continuous>  metoprolol succinate ER 25 milliGRAM(s) Oral daily  multivitamin/minerals 1 Tablet(s) Oral daily  pantoprazole    Tablet 40 milliGRAM(s) Oral before breakfast  predniSONE   Tablet 5 milliGRAM(s) Oral daily  saccharomyces boulardii 250 milliGRAM(s) Oral two times a day  senna 2 Tablet(s) Oral at bedtime  tamsulosin 0.4 milliGRAM(s) Oral at bedtime  zinc sulfate 220 milliGRAM(s) Oral daily    MEDICATIONS  (PRN):  acetaminophen     Tablet .. 650 milliGRAM(s) Oral every 6 hours PRN Mild Pain (1 - 3)  morphine  - Injectable 2 milliGRAM(s) IV Push every 6 hours PRN Severe Pain (7 - 10)  morphine  - Injectable 4 milliGRAM(s) IV Push two times a day PRN wound care  polyethylene glycol 3350 17 Gram(s) Oral daily PRN Constipation  traMADol 50 milliGRAM(s) Oral every 8 hours PRN for severe pain    Allergies    No Known Allergies    Intolerances        Lab Results:                        11.9   9.94  )-----------( 312      ( 19 Dec 2023 16:12 )             36.0     12-19    141  |  99  |  12  ----------------------------<  92  3.9   |  28  |  1.0    Ca    9.0      19 Dec 2023 16:12  Phos  3.1     12-19  Mg     2.0     12-19    TPro  6.7  /  Alb  4.1  /  TBili  0.6  /  DBili  x   /  AST  17  /  ALT  16  /  AlkPhos  85  12-19    PT/INR - ( 19 Dec 2023 16:12 )   PT: 19.70 sec;   INR: 1.72 ratio         PTT - ( 19 Dec 2023 16:12 )  PTT:50.5 sec  Urinalysis Basic - ( 19 Dec 2023 16:12 )    Color: x / Appearance: x / SG: x / pH: x  Gluc: 92 mg/dL / Ketone: x  / Bili: x / Urobili: x   Blood: x / Protein: x / Nitrite: x   Leuk Esterase: x / RBC: x / WBC x   Sq Epi: x / Non Sq Epi: x / Bacteria: x      LIVER FUNCTIONS - ( 19 Dec 2023 16:12 )  Alb: 4.1 g/dL / Pro: 6.7 g/dL / ALK PHOS: 85 U/L / ALT: 16 U/L / AST: 17 U/L / GGT: x           IMAGING STUDIES:   < from: CT Lower Extremity w/ IV Cont, Left (12.08.23 @ 18:49) >  IMPRESSION:    1.  Focal 5.7 cm hyperdense fluid collection of the anterolateral left   lower extremity, most compatible with a hematoma. No evidence of contrast   extravasation.  2.  Diffuse circumferential subcutaneous edema and fat stranding of the   left lower extremity. Correlate for cellulitis.  3.  Superficial skin ulceration of the anterolateral left lower extremity.  4.  No evidence of osseous erosions.    --- End of Report ---    < end of copied text >      EXAM:  GENERAL: NAD, lying in bed comfortably.   HEAD:  Atraumatic, Normocephalic  CHEST/LUNG: Breathing comfortably on room air, No increased work of breathing noted.   HEART: In no acute cardiopulmonary distress.   PSYCH: AAOx3  SKIN: LLE: anterior aspect full thickness wound ~4cm x3cm, pink and moist base with surrounding erythema and swelling noted. No purulent drainage, malodor, or active bleeding evident.      Dressing change performed. Patient tolerated well.             
Patient is a 83y old  Male who presents with a chief complaint of LLE wound (22 Dec 2023 10:33)    INTERVAL HPI/OVERNIGHT EVENTS:  - Afebrile, no acute events overnight     Vital Signs Last 24 Hrs  T(C): 36.1 (23 Dec 2023 08:00), Max: 37.1 (23 Dec 2023 00:00)  T(F): 97 (23 Dec 2023 08:00), Max: 98.7 (23 Dec 2023 00:00)  HR: 77 (23 Dec 2023 08:00) (75 - 91)  BP: 110/52 (23 Dec 2023 08:00) (110/52 - 162/74)  BP(mean): 75 (23 Dec 2023 08:00) (75 - 82)  RR: 18 (23 Dec 2023 08:00) (12 - 20)  SpO2: 96% (23 Dec 2023 08:00) (95% - 100%)    Parameters below as of 23 Dec 2023 08:00  Patient On (Oxygen Delivery Method): room air    I&O's Summary    22 Dec 2023 07:01  -  23 Dec 2023 07:00  --------------------------------------------------------  IN: 350 mL / OUT: 800 mL / NET: -450 mL    23 Dec 2023 07:01  -  23 Dec 2023 14:45  --------------------------------------------------------  IN: 100 mL / OUT: 600 mL / NET: -500 mL    LABS:                        12.7   11.91 )-----------( 380      ( 23 Dec 2023 12:06 )             39.1     23 Dec 2023 12:06    143    |  101    |  13     ----------------------------<  123    4.2     |  30     |  1.0      Ca    9.0        23 Dec 2023 12:06  Phos  3.3       23 Dec 2023 12:06  Mg     2.3       23 Dec 2023 12:06          MEDICATIONS  (STANDING):  ampicillin/sulbactam  IVPB 3 Gram(s) IV Intermittent every 6 hours  ascorbic acid 500 milliGRAM(s) Oral daily  atorvastatin 40 milliGRAM(s) Oral at bedtime  bacitracin   Ointment 1 Application(s) Topical two times a day  chlorhexidine 4% Liquid 1 Application(s) Topical <User Schedule>  diphenhydrAMINE 25 milliGRAM(s) Oral once  furosemide    Tablet 40 milliGRAM(s) Oral daily  lactated ringers. 1000 milliLiter(s) (50 mL/Hr) IV Continuous <Continuous>  melatonin 5 milliGRAM(s) Oral at bedtime  metoprolol succinate ER 25 milliGRAM(s) Oral daily  multivitamin/minerals 1 Tablet(s) Oral daily  pantoprazole    Tablet 40 milliGRAM(s) Oral before breakfast  predniSONE   Tablet 5 milliGRAM(s) Oral daily  saccharomyces boulardii 250 milliGRAM(s) Oral two times a day  senna 2 Tablet(s) Oral at bedtime  tamsulosin 0.4 milliGRAM(s) Oral at bedtime  zinc sulfate 220 milliGRAM(s) Oral daily    MEDICATIONS  (PRN):  acetaminophen     Tablet .. 650 milliGRAM(s) Oral every 6 hours PRN Mild Pain (1 - 3)  morphine  - Injectable 2 milliGRAM(s) IV Push every 6 hours PRN Severe Pain (7 - 10)  morphine  - Injectable 4 milliGRAM(s) IV Push two times a day PRN wound care  polyethylene glycol 3350 17 Gram(s) Oral daily PRN Constipation  traMADol 50 milliGRAM(s) Oral every 8 hours PRN for severe pain    PHYSICAL EXAM:  GENERAL: NAD, lying in bed comfortably.   HEAD:  Atraumatic, Normocephalic  CHEST/LUNG: Breathing comfortably on room air, No increased work of breathing noted.   HEART: In no acute cardiopulmonary distress.   PSYCH: AAOx3  SKIN:   LLE: anterior aspect full thickness wound ~4cm x3cm, with granulation tissue, erythema and swelling noted. No purulent drainage, malodor, or active bleeding evident.
  PENNYCRISTOPHER  83y, Male  Allergy: No Known Allergies      LOS  3d    CHIEF COMPLAINT: LLE wound (20 Dec 2023 11:43)      INTERVAL EVENTS/HPI  - No acute events overnight  - T(F): , Max: 98.8 (12-21-23 @ 23:35)  - Denies any worsening symptoms  - Tolerating medication  - WBC Count: 10.52 (12-21-23 @ 12:03)  WBC Count: 9.60 (12-20-23 @ 12:24)     - Creatinine: 0.9 (12-21-23 @ 12:03)  Creatinine: 0.9 (12-20-23 @ 12:24)       ROS  General: Denies rigors, nightsweats  HEENT: Denies headache, rhinorrhea, sore throat, eye pain  CV: Denies CP, palpitations  PULM: Denies wheezing, hemoptysis  GI: Denies hematemesis, hematochezia, melena  : Denies discharge, hematuria  MSK: Denies arthralgias, myalgias  SKIN: Denies rash, lesions  NEURO: Denies paresthesias, weakness  PSYCH: Denies depression, anxiety    VITALS:  T(F): 98.8, Max: 98.8 (12-21-23 @ 23:35)  HR: 90  BP: 143/66  RR: 18Vital Signs Last 24 Hrs  T(C): 37.1 (21 Dec 2023 23:35), Max: 37.1 (21 Dec 2023 23:35)  T(F): 98.8 (21 Dec 2023 23:35), Max: 98.8 (21 Dec 2023 23:35)  HR: 90 (21 Dec 2023 23:35) (84 - 90)  BP: 143/66 (21 Dec 2023 23:35) (113/62 - 143/66)  BP(mean): 95 (21 Dec 2023 23:35) (81 - 95)  RR: 18 (21 Dec 2023 23:35) (18 - 20)  SpO2: 97% (21 Dec 2023 23:35) (96% - 98%)    Parameters below as of 21 Dec 2023 19:35  Patient On (Oxygen Delivery Method): room air        PHYSICAL EXAM:  Gen: NAD, resting in bed  HEENT: Normocephalic, atraumatic  Neck: supple, no lymphadenopathy  CV: Regular rate & regular rhythm  Lungs: decreased BS at bases, no fremitus  Abdomen: Soft, BS present  Ext: Warm, well perfused  Neuro: non focal, awake  Skin: no rash, no erythema  Lines: no phlebitis    FH: Non-contributory  Social Hx: Non-contributory    TESTS & MEASUREMENTS:                        12.3  10.52 )-----------( 352      ( 21 Dec 2023 12:03 )             38.1    12-21    142  |  101  |  9<L>  ----------------------------<  123<H>  3.7   |  31  |  0.9    Ca    8.9      21 Dec 2023 12:03  Phos  2.4     12-21  Mg     2.3     12-21          Urinalysis Basic - ( 21 Dec 2023 12:03 )    Color: x / Appearance: x / SG: x / pH: x  Gluc: 123 mg/dL / Ketone: x  / Bili: x / Urobili: x  Blood: x / Protein: x / Nitrite: x  Leuk Esterase: x / RBC: x / WBC x  Sq Epi: x / Non Sq Epi: x / Bacteria: x        Culture - Blood (collected 12-19-23 @ 16:12)  Source: .Blood Blood-Peripheral  Preliminary Report (12-21-23 @ 23:01):    No growth at 48 Hours    Culture - Blood (collected 12-19-23 @ 16:12)  Source: .Blood Blood-Peripheral  Preliminary Report (12-21-23 @ 23:01):    No growth at 48 Hours        Lactate, Blood: 1.2 mmol/L (12-19-23 @ 16:12)      INFECTIOUS DISEASES TESTING  MRSA PCR Result.: Negative (12-21-23 @ 07:45)      INFLAMMATORY MARKERS      RADIOLOGY & ADDITIONAL TESTS:  I have personally reviewed the last available Chest xray  CXR      CT      CARDIOLOGY TESTING      MEDICATIONS  ampicillin/sulbactam  IVPB 3 IV Intermittent every 6 hours  ascorbic acid 500 Oral daily  atorvastatin 40 Oral at bedtime  bacitracin   Ointment 1 Topical two times a day  chlorhexidine 4% Liquid 1 Topical <User Schedule>  diphenhydrAMINE 25 Oral once  furosemide    Tablet 40 Oral daily  lactated ringers. 1000 IV Continuous <Continuous>  melatonin 5 Oral at bedtime  metoprolol succinate ER 25 Oral daily  multivitamin/minerals 1 Oral daily  pantoprazole    Tablet 40 Oral before breakfast  predniSONE   Tablet 5 Oral daily  saccharomyces boulardii 250 Oral two times a day  senna 2 Oral at bedtime  tamsulosin 0.4 Oral at bedtime  zinc sulfate 220 Oral daily      WEIGHT  Weight (kg): 127.9 (12-19-23 @ 14:56)  Creatinine: 0.9 mg/dL (12-21-23 @ 12:03)      ANTIBIOTICS:  ampicillin/sulbactam  IVPB 3 Gram(s) IV Intermittent every 6 hours      All available historical records have been reviewed

## 2025-04-15 ENCOUNTER — OUTPATIENT (OUTPATIENT)
Dept: OUTPATIENT SERVICES | Facility: HOSPITAL | Age: 85
LOS: 1 days | End: 2025-04-15
Payer: MEDICARE

## 2025-04-15 DIAGNOSIS — Z12.31 ENCOUNTER FOR SCREENING MAMMOGRAM FOR MALIGNANT NEOPLASM OF BREAST: ICD-10-CM

## 2025-04-15 DIAGNOSIS — Z13.820 ENCOUNTER FOR SCREENING FOR OSTEOPOROSIS: ICD-10-CM

## 2025-04-15 PROCEDURE — 77080 DXA BONE DENSITY AXIAL: CPT | Mod: 26

## 2025-04-15 PROCEDURE — 77080 DXA BONE DENSITY AXIAL: CPT

## 2025-04-16 DIAGNOSIS — Z13.820 ENCOUNTER FOR SCREENING FOR OSTEOPOROSIS: ICD-10-CM

## 2025-04-21 DIAGNOSIS — M81.0 AGE-RELATED OSTEOPOROSIS WITHOUT CURRENT PATHOLOGICAL FRACTURE: ICD-10-CM

## 2025-06-03 ENCOUNTER — APPOINTMENT (OUTPATIENT)
Dept: PULMONOLOGY | Facility: CLINIC | Age: 85
End: 2025-06-03
Payer: MEDICARE

## 2025-06-03 VITALS
HEART RATE: 87 BPM | HEIGHT: 71 IN | BODY MASS INDEX: 35.7 KG/M2 | RESPIRATION RATE: 14 BRPM | SYSTOLIC BLOOD PRESSURE: 124 MMHG | WEIGHT: 255 LBS | OXYGEN SATURATION: 97 % | DIASTOLIC BLOOD PRESSURE: 68 MMHG

## 2025-06-03 DIAGNOSIS — R91.1 SOLITARY PULMONARY NODULE: ICD-10-CM

## 2025-06-03 DIAGNOSIS — G47.33 OBSTRUCTIVE SLEEP APNEA (ADULT) (PEDIATRIC): ICD-10-CM

## 2025-06-03 PROCEDURE — G2211 COMPLEX E/M VISIT ADD ON: CPT

## 2025-06-03 PROCEDURE — 99213 OFFICE O/P EST LOW 20 MIN: CPT
